# Patient Record
Sex: FEMALE | Race: WHITE | Employment: OTHER | URBAN - NONMETROPOLITAN AREA
[De-identification: names, ages, dates, MRNs, and addresses within clinical notes are randomized per-mention and may not be internally consistent; named-entity substitution may affect disease eponyms.]

---

## 2017-06-23 ENCOUNTER — HOSPITAL ENCOUNTER (OUTPATIENT)
Dept: WOMENS IMAGING | Age: 47
Discharge: HOME | End: 2017-06-23
Payer: OTHER GOVERNMENT

## 2017-06-23 DIAGNOSIS — Z12.39 BREAST CANCER SCREENING: ICD-10-CM

## 2017-06-23 PROCEDURE — 77063 BREAST TOMOSYNTHESIS BI: CPT

## 2017-08-19 ENCOUNTER — TELEPHONE (OUTPATIENT)
Dept: PRIMARY CARE CLINIC | Age: 47
End: 2017-08-19

## 2017-08-22 ENCOUNTER — TELEPHONE (OUTPATIENT)
Dept: FAMILY MEDICINE CLINIC | Age: 47
End: 2017-08-22

## 2017-08-24 DIAGNOSIS — G93.32 CFS (CHRONIC FATIGUE SYNDROME): ICD-10-CM

## 2017-08-24 DIAGNOSIS — Z00.00 WELL ADULT EXAM: Primary | ICD-10-CM

## 2017-09-11 DIAGNOSIS — Z00.00 WELL ADULT EXAM: ICD-10-CM

## 2017-09-11 DIAGNOSIS — G93.32 CFS (CHRONIC FATIGUE SYNDROME): ICD-10-CM

## 2017-09-11 LAB
ALBUMIN SERPL-MCNC: 4.5 G/DL (ref 3.5–5.2)
ALP BLD-CCNC: 65 U/L (ref 35–104)
ALT SERPL-CCNC: 17 U/L (ref 5–33)
ANION GAP SERPL CALCULATED.3IONS-SCNC: 17 MMOL/L (ref 7–19)
AST SERPL-CCNC: 18 U/L (ref 5–32)
BASOPHILS ABSOLUTE: 0 K/UL (ref 0–0.2)
BASOPHILS RELATIVE PERCENT: 0.6 % (ref 0–1)
BILIRUB SERPL-MCNC: 0.4 MG/DL (ref 0.2–1.2)
BUN BLDV-MCNC: 8 MG/DL (ref 6–20)
CALCIUM SERPL-MCNC: 9.2 MG/DL (ref 8.6–10)
CHLORIDE BLD-SCNC: 98 MMOL/L (ref 98–111)
CHOLESTEROL, TOTAL: 213 MG/DL (ref 160–199)
CO2: 24 MMOL/L (ref 22–29)
CREAT SERPL-MCNC: 0.6 MG/DL (ref 0.5–0.9)
EOSINOPHILS ABSOLUTE: 0.1 K/UL (ref 0–0.6)
EOSINOPHILS RELATIVE PERCENT: 1.2 % (ref 0–5)
GFR NON-AFRICAN AMERICAN: >60
GLUCOSE BLD-MCNC: 90 MG/DL (ref 74–109)
HCT VFR BLD CALC: 39.8 % (ref 37–47)
HDLC SERPL-MCNC: 101 MG/DL (ref 65–121)
HEMOGLOBIN: 13.2 G/DL (ref 12–16)
LDL CHOLESTEROL CALCULATED: 95 MG/DL
LYMPHOCYTES ABSOLUTE: 2 K/UL (ref 1.1–4.5)
LYMPHOCYTES RELATIVE PERCENT: 29.9 % (ref 20–40)
MCH RBC QN AUTO: 32.7 PG (ref 27–31)
MCHC RBC AUTO-ENTMCNC: 33.2 G/DL (ref 33–37)
MCV RBC AUTO: 98.5 FL (ref 81–99)
MONOCYTES ABSOLUTE: 0.6 K/UL (ref 0–0.9)
MONOCYTES RELATIVE PERCENT: 8.5 % (ref 0–10)
NEUTROPHILS ABSOLUTE: 3.9 K/UL (ref 1.5–7.5)
NEUTROPHILS RELATIVE PERCENT: 59.5 % (ref 50–65)
PDW BLD-RTO: 11.9 % (ref 11.5–14.5)
PLATELET # BLD: 295 K/UL (ref 130–400)
PMV BLD AUTO: 10.5 FL (ref 9.4–12.3)
POTASSIUM SERPL-SCNC: 4.3 MMOL/L (ref 3.5–5)
RBC # BLD: 4.04 M/UL (ref 4.2–5.4)
SODIUM BLD-SCNC: 139 MMOL/L (ref 136–145)
T4 FREE: 1.4 NG/ML (ref 0.9–1.7)
TOTAL PROTEIN: 7.4 G/DL (ref 6.6–8.7)
TRIGL SERPL-MCNC: 85 MG/DL (ref 150–199)
TSH SERPL DL<=0.05 MIU/L-ACNC: 1.89 UIU/ML (ref 0.27–4.2)
WBC # BLD: 6.6 K/UL (ref 4.8–10.8)

## 2017-09-15 DIAGNOSIS — G93.32 CHRONIC FATIGUE SYNDROME: ICD-10-CM

## 2017-09-15 DIAGNOSIS — F41.8 DEPRESSION WITH ANXIETY: ICD-10-CM

## 2017-09-15 DIAGNOSIS — E55.9 AVITAMINOSIS D: ICD-10-CM

## 2017-09-15 PROBLEM — J30.2 SEASONAL ALLERGIC RHINITIS: Status: ACTIVE | Noted: 2017-09-15

## 2017-09-15 PROBLEM — K21.9 ACID REFLUX DISEASE: Status: ACTIVE | Noted: 2017-09-15

## 2017-09-15 RX ORDER — ACETAMINOPHEN 160 MG
1 TABLET,DISINTEGRATING ORAL DAILY
COMMUNITY
End: 2017-09-18 | Stop reason: SDUPTHER

## 2017-09-15 RX ORDER — LEVOCETIRIZINE DIHYDROCHLORIDE 5 MG/1
5 TABLET, FILM COATED ORAL NIGHTLY
COMMUNITY
End: 2017-09-18 | Stop reason: ALTCHOICE

## 2017-09-15 RX ORDER — TRIAMCINOLONE ACETONIDE 55 UG/1
2 SPRAY, METERED NASAL DAILY
COMMUNITY
End: 2017-09-18 | Stop reason: ALTCHOICE

## 2017-09-15 RX ORDER — ESOMEPRAZOLE MAGNESIUM 40 MG/1
40 CAPSULE, DELAYED RELEASE ORAL
COMMUNITY
End: 2017-09-18 | Stop reason: ALTCHOICE

## 2017-09-18 ENCOUNTER — OFFICE VISIT (OUTPATIENT)
Dept: FAMILY MEDICINE CLINIC | Age: 47
End: 2017-09-18
Payer: OTHER GOVERNMENT

## 2017-09-18 VITALS
OXYGEN SATURATION: 98 % | TEMPERATURE: 98.3 F | WEIGHT: 127.25 LBS | RESPIRATION RATE: 18 BRPM | DIASTOLIC BLOOD PRESSURE: 82 MMHG | HEIGHT: 63 IN | SYSTOLIC BLOOD PRESSURE: 120 MMHG | HEART RATE: 94 BPM | BODY MASS INDEX: 22.55 KG/M2

## 2017-09-18 DIAGNOSIS — Z00.00 ANNUAL PHYSICAL EXAM: Primary | ICD-10-CM

## 2017-09-18 DIAGNOSIS — E55.9 AVITAMINOSIS D: ICD-10-CM

## 2017-09-18 DIAGNOSIS — J30.2 SEASONAL ALLERGIC RHINITIS, UNSPECIFIED ALLERGIC RHINITIS TRIGGER: ICD-10-CM

## 2017-09-18 DIAGNOSIS — Z12.11 SCREENING FOR COLON CANCER: ICD-10-CM

## 2017-09-18 DIAGNOSIS — K21.9 GASTROESOPHAGEAL REFLUX DISEASE WITHOUT ESOPHAGITIS: ICD-10-CM

## 2017-09-18 DIAGNOSIS — F41.8 DEPRESSION WITH ANXIETY: ICD-10-CM

## 2017-09-18 DIAGNOSIS — M51.36 DEGENERATIVE DISC DISEASE, LUMBAR: ICD-10-CM

## 2017-09-18 DIAGNOSIS — F33.40 SEASONAL AFFECTIVE DISORDER IN REMISSION (HCC): ICD-10-CM

## 2017-09-18 PROBLEM — M51.369 DEGENERATIVE DISC DISEASE, LUMBAR: Status: ACTIVE | Noted: 2017-09-18

## 2017-09-18 PROCEDURE — 99396 PREV VISIT EST AGE 40-64: CPT | Performed by: INTERNAL MEDICINE

## 2017-09-18 RX ORDER — ACETAMINOPHEN 160 MG
TABLET,DISINTEGRATING ORAL
Qty: 24 CAPSULE | Refills: 3
Start: 2017-09-18 | End: 2018-09-20 | Stop reason: ALTCHOICE

## 2017-09-18 ASSESSMENT — PATIENT HEALTH QUESTIONNAIRE - PHQ9
SUM OF ALL RESPONSES TO PHQ9 QUESTIONS 1 & 2: 0
2. FEELING DOWN, DEPRESSED OR HOPELESS: 0
1. LITTLE INTEREST OR PLEASURE IN DOING THINGS: 0
SUM OF ALL RESPONSES TO PHQ QUESTIONS 1-9: 0

## 2017-09-18 ASSESSMENT — ENCOUNTER SYMPTOMS
RHINORRHEA: 0
DIARRHEA: 0
EYE DISCHARGE: 0
COUGH: 0
ABDOMINAL PAIN: 0
SINUS PRESSURE: 0
EYE REDNESS: 0
SHORTNESS OF BREATH: 0
EYE PAIN: 0
COLOR CHANGE: 0
BLOOD IN STOOL: 0
VOMITING: 0
WHEEZING: 0
CHEST TIGHTNESS: 0
VOICE CHANGE: 0
SORE THROAT: 0

## 2017-12-08 ENCOUNTER — OFFICE VISIT (OUTPATIENT)
Dept: URGENT CARE | Age: 47
End: 2017-12-08
Payer: OTHER GOVERNMENT

## 2017-12-08 VITALS
BODY MASS INDEX: 22.93 KG/M2 | DIASTOLIC BLOOD PRESSURE: 83 MMHG | SYSTOLIC BLOOD PRESSURE: 137 MMHG | RESPIRATION RATE: 20 BRPM | HEART RATE: 90 BPM | WEIGHT: 129.38 LBS | OXYGEN SATURATION: 99 % | TEMPERATURE: 98 F | HEIGHT: 63 IN

## 2017-12-08 DIAGNOSIS — R05.9 COUGH: Primary | ICD-10-CM

## 2017-12-08 PROCEDURE — 96372 THER/PROPH/DIAG INJ SC/IM: CPT | Performed by: PHYSICIAN ASSISTANT

## 2017-12-08 PROCEDURE — 99213 OFFICE O/P EST LOW 20 MIN: CPT | Performed by: PHYSICIAN ASSISTANT

## 2017-12-08 RX ORDER — DEXAMETHASONE SODIUM PHOSPHATE 10 MG/ML
10 INJECTION INTRAMUSCULAR; INTRAVENOUS ONCE
Status: COMPLETED | OUTPATIENT
Start: 2017-12-08 | End: 2017-12-08

## 2017-12-08 RX ORDER — DEXTROMETHORPHAN HYDROBROMIDE AND PROMETHAZINE HYDROCHLORIDE 15; 6.25 MG/5ML; MG/5ML
5 SYRUP ORAL NIGHTLY PRN
Qty: 180 ML | Refills: 0 | Status: SHIPPED | OUTPATIENT
Start: 2017-12-08 | End: 2017-12-15

## 2017-12-08 RX ADMIN — DEXAMETHASONE SODIUM PHOSPHATE 10 MG: 10 INJECTION INTRAMUSCULAR; INTRAVENOUS at 16:34

## 2017-12-08 ASSESSMENT — ENCOUNTER SYMPTOMS
CHEST TIGHTNESS: 0
WHEEZING: 0
COUGH: 1
VOMITING: 0
DIARRHEA: 0
ABDOMINAL PAIN: 0
SHORTNESS OF BREATH: 0
RHINORRHEA: 0
SORE THROAT: 0
NAUSEA: 0

## 2017-12-08 NOTE — PATIENT INSTRUCTIONS
notice more mucus or a change in the color of your mucus. ? · You have new symptoms, such as a sore throat, an earache, or sinus pain. ? · You do not get better as expected. Where can you learn more? Go to [email or web address] org and sign in to your [email or web address] account. Enter D279 in the SpectraLinear box to learn more about \"Cough: Care Instructions. \"     If you do not have an account, please click on the \"Sign Up Now\" link. Current as of: May 12, 2017  Content Version: 11.4  © 0619-8770 Health[employer], [employer]. Care instructions adapted under license by [facility] ([facility]). If you have questions about a medical condition or this instruction, always ask your healthcare professional. [address] any warranty or liability for your use of this information.

## 2017-12-15 ENCOUNTER — OFFICE VISIT (OUTPATIENT)
Dept: FAMILY MEDICINE CLINIC | Age: 47
End: 2017-12-15
Payer: OTHER GOVERNMENT

## 2017-12-15 VITALS
HEIGHT: 63 IN | TEMPERATURE: 96.8 F | BODY MASS INDEX: 22.32 KG/M2 | OXYGEN SATURATION: 98 % | WEIGHT: 126 LBS | SYSTOLIC BLOOD PRESSURE: 122 MMHG | HEART RATE: 76 BPM | DIASTOLIC BLOOD PRESSURE: 80 MMHG

## 2017-12-15 DIAGNOSIS — B96.89 BACTERIAL UPPER RESPIRATORY INFECTION: Primary | ICD-10-CM

## 2017-12-15 DIAGNOSIS — J06.9 BACTERIAL UPPER RESPIRATORY INFECTION: Primary | ICD-10-CM

## 2017-12-15 PROCEDURE — 99213 OFFICE O/P EST LOW 20 MIN: CPT | Performed by: CLINICAL NURSE SPECIALIST

## 2017-12-15 RX ORDER — AMOXICILLIN AND CLAVULANATE POTASSIUM 500; 125 MG/1; MG/1
1 TABLET, FILM COATED ORAL 2 TIMES DAILY
Qty: 14 TABLET | Refills: 0 | Status: SHIPPED | OUTPATIENT
Start: 2017-12-15 | End: 2017-12-22

## 2017-12-15 ASSESSMENT — ENCOUNTER SYMPTOMS
CHEST TIGHTNESS: 0
EYE PAIN: 0
FACIAL SWELLING: 0
NAUSEA: 0
SORE THROAT: 0
SHORTNESS OF BREATH: 0
COUGH: 1
SINUS PRESSURE: 1
VOMITING: 0
WHEEZING: 0
RHINORRHEA: 1
EYE DISCHARGE: 0

## 2018-01-03 ENCOUNTER — OFFICE VISIT (OUTPATIENT)
Dept: FAMILY MEDICINE CLINIC | Age: 48
End: 2018-01-03
Payer: OTHER GOVERNMENT

## 2018-01-03 VITALS
HEART RATE: 69 BPM | SYSTOLIC BLOOD PRESSURE: 134 MMHG | DIASTOLIC BLOOD PRESSURE: 84 MMHG | OXYGEN SATURATION: 99 % | BODY MASS INDEX: 23.38 KG/M2 | TEMPERATURE: 98.4 F | WEIGHT: 132 LBS

## 2018-01-03 DIAGNOSIS — J20.9 ACUTE BRONCHITIS, UNSPECIFIED ORGANISM: Primary | ICD-10-CM

## 2018-01-03 PROCEDURE — 99213 OFFICE O/P EST LOW 20 MIN: CPT | Performed by: CLINICAL NURSE SPECIALIST

## 2018-01-03 RX ORDER — CEFDINIR 300 MG/1
300 CAPSULE ORAL 2 TIMES DAILY
Qty: 14 CAPSULE | Refills: 0 | Status: SHIPPED | OUTPATIENT
Start: 2018-01-03 | End: 2018-01-10

## 2018-01-03 RX ORDER — PREDNISONE 20 MG/1
TABLET ORAL
Qty: 10 TABLET | Refills: 0 | Status: SHIPPED | OUTPATIENT
Start: 2018-01-03 | End: 2018-08-03 | Stop reason: ALTCHOICE

## 2018-01-03 ASSESSMENT — ENCOUNTER SYMPTOMS
WHEEZING: 0
SINUS PRESSURE: 0
SORE THROAT: 0
VOMITING: 0
COUGH: 1
RHINORRHEA: 0
FACIAL SWELLING: 0
EYE PAIN: 0
EYE DISCHARGE: 0
SHORTNESS OF BREATH: 0
CHEST TIGHTNESS: 1
NAUSEA: 0

## 2018-05-21 ENCOUNTER — OFFICE VISIT (OUTPATIENT)
Dept: FAMILY MEDICINE CLINIC | Age: 48
End: 2018-05-21
Payer: OTHER GOVERNMENT

## 2018-05-21 VITALS
HEIGHT: 63 IN | TEMPERATURE: 97.6 F | DIASTOLIC BLOOD PRESSURE: 78 MMHG | SYSTOLIC BLOOD PRESSURE: 118 MMHG | HEART RATE: 59 BPM | OXYGEN SATURATION: 97 % | BODY MASS INDEX: 22.68 KG/M2 | WEIGHT: 128 LBS

## 2018-05-21 DIAGNOSIS — L29.9 ITCHING: ICD-10-CM

## 2018-05-21 DIAGNOSIS — L23.7 ALLERGIC CONTACT DERMATITIS DUE TO PLANTS, EXCEPT FOOD: Primary | ICD-10-CM

## 2018-05-21 PROCEDURE — 99213 OFFICE O/P EST LOW 20 MIN: CPT | Performed by: INTERNAL MEDICINE

## 2018-05-21 PROCEDURE — 96372 THER/PROPH/DIAG INJ SC/IM: CPT | Performed by: INTERNAL MEDICINE

## 2018-05-21 RX ORDER — TRIAMCINOLONE ACETONIDE OINTMENT USP, 0.05% 0.5 MG/G
OINTMENT TOPICAL 2 TIMES DAILY
Qty: 45 G | Refills: 1 | Status: SHIPPED | OUTPATIENT
Start: 2018-05-21 | End: 2018-08-03

## 2018-05-21 RX ORDER — METHYLPREDNISOLONE 4 MG/1
TABLET ORAL
Qty: 1 KIT | Refills: 0 | Status: SHIPPED | OUTPATIENT
Start: 2018-05-21 | End: 2018-08-03 | Stop reason: ALTCHOICE

## 2018-05-21 RX ORDER — METHYLPREDNISOLONE ACETATE 80 MG/ML
80 INJECTION, SUSPENSION INTRA-ARTICULAR; INTRALESIONAL; INTRAMUSCULAR; SOFT TISSUE ONCE
Status: COMPLETED | OUTPATIENT
Start: 2018-05-21 | End: 2018-05-21

## 2018-05-21 RX ADMIN — METHYLPREDNISOLONE ACETATE 80 MG: 80 INJECTION, SUSPENSION INTRA-ARTICULAR; INTRALESIONAL; INTRAMUSCULAR; SOFT TISSUE at 17:43

## 2018-05-21 ASSESSMENT — ENCOUNTER SYMPTOMS
SINUS PRESSURE: 0
ROS SKIN COMMENTS: SEE HPI
VOMITING: 0
COLOR CHANGE: 0
ABDOMINAL PAIN: 0
SORE THROAT: 0
RHINORRHEA: 0
EYE PAIN: 0
CHEST TIGHTNESS: 0
WHEEZING: 0
BLOOD IN STOOL: 0
EYE DISCHARGE: 0
VOICE CHANGE: 0
DIARRHEA: 0
EYE REDNESS: 0
SHORTNESS OF BREATH: 0
COUGH: 0

## 2018-07-16 ENCOUNTER — HOSPITAL ENCOUNTER (OUTPATIENT)
Dept: WOMENS IMAGING | Age: 48
Discharge: HOME | End: 2018-07-16
Payer: OTHER GOVERNMENT

## 2018-07-16 DIAGNOSIS — Z12.39 BREAST CANCER SCREENING: ICD-10-CM

## 2018-07-16 PROCEDURE — 77063 BREAST TOMOSYNTHESIS BI: CPT

## 2018-07-17 ENCOUNTER — TELEPHONE (OUTPATIENT)
Dept: WOMENS IMAGING | Age: 48
End: 2018-07-17

## 2018-07-17 NOTE — TELEPHONE ENCOUNTER
Tried to contact patient to schedule diagnostic imaging     Patient called and scheduled diagnostic imaging.

## 2018-07-19 ENCOUNTER — HOSPITAL ENCOUNTER (OUTPATIENT)
Dept: WOMENS IMAGING | Age: 48
Discharge: HOME | End: 2018-07-19
Payer: OTHER GOVERNMENT

## 2018-07-19 ENCOUNTER — HOSPITAL ENCOUNTER (OUTPATIENT)
Dept: ULTRASOUND IMAGING | Age: 48
Discharge: HOME | End: 2018-07-19
Payer: OTHER GOVERNMENT

## 2018-07-19 DIAGNOSIS — N64.89 BREAST ASYMMETRY: ICD-10-CM

## 2018-07-19 PROCEDURE — 76642 ULTRASOUND BREAST LIMITED: CPT

## 2018-07-19 PROCEDURE — G0279 TOMOSYNTHESIS, MAMMO: HCPCS

## 2018-08-03 ENCOUNTER — OFFICE VISIT (OUTPATIENT)
Dept: FAMILY MEDICINE CLINIC | Age: 48
End: 2018-08-03
Payer: OTHER GOVERNMENT

## 2018-08-03 VITALS
DIASTOLIC BLOOD PRESSURE: 80 MMHG | SYSTOLIC BLOOD PRESSURE: 122 MMHG | HEART RATE: 75 BPM | WEIGHT: 131.6 LBS | HEIGHT: 63 IN | BODY MASS INDEX: 23.32 KG/M2 | TEMPERATURE: 97.1 F

## 2018-08-03 DIAGNOSIS — R92.8 ABNORMAL MAMMOGRAM OF RIGHT BREAST: ICD-10-CM

## 2018-08-03 DIAGNOSIS — H10.13 ALLERGIC CONJUNCTIVITIS OF BOTH EYES: ICD-10-CM

## 2018-08-03 PROCEDURE — 99213 OFFICE O/P EST LOW 20 MIN: CPT | Performed by: INTERNAL MEDICINE

## 2018-08-03 RX ORDER — AZELASTINE HYDROCHLORIDE 0.5 MG/ML
1 SOLUTION/ DROPS OPHTHALMIC 2 TIMES DAILY
Qty: 6 ML | Refills: 2 | Status: SHIPPED | OUTPATIENT
Start: 2018-08-03 | End: 2018-09-02

## 2018-08-03 ASSESSMENT — ENCOUNTER SYMPTOMS
SHORTNESS OF BREATH: 0
BLOOD IN STOOL: 0
EYE DISCHARGE: 1
COLOR CHANGE: 0
PHOTOPHOBIA: 0
RHINORRHEA: 0
COUGH: 0
ABDOMINAL PAIN: 0
EYE REDNESS: 1
VOICE CHANGE: 0
EYE PAIN: 0
WHEEZING: 0
DIARRHEA: 0
SINUS PRESSURE: 0
CHEST TIGHTNESS: 0
VOMITING: 0
SORE THROAT: 0

## 2018-08-03 NOTE — PATIENT INSTRUCTIONS
Patient Education        Pinkeye: Care Instructions  Your Care Instructions    Pinkeye is redness and swelling of the eye surface and the conjunctiva (the lining of the eyelid and the covering of the white part of the eye). Pinkeye is also called conjunctivitis. Pinkeye is often caused by infection with bacteria or a virus. Dry air, allergies, smoke, and chemicals are other common causes. Pinkeye often clears on its own in 7 to 10 days. Antibiotics only help if the pinkeye is caused by bacteria. Pinkeye caused by infection spreads easily. If an allergy or chemical is causing pinkeye, it will not go away unless you can avoid whatever is causing it. Follow-up care is a key part of your treatment and safety. Be sure to make and go to all appointments, and call your doctor if you are having problems. It's also a good idea to know your test results and keep a list of the medicines you take. How can you care for yourself at home? · Wash your hands often. Always wash them before and after you treat pinkeye or touch your eyes or face. · Use moist cotton or a clean, wet cloth to remove crust. Wipe from the inside corner of the eye to the outside. Use a clean part of the cloth for each wipe. · Put cold or warm wet cloths on your eye a few times a day if the eye hurts. · Do not wear contact lenses or eye makeup until the pinkeye is gone. Throw away any eye makeup you were using when you got pinkeye. Clean your contacts and storage case. If you wear disposable contacts, use a new pair when your eye has cleared and it is safe to wear contacts again. · If the doctor gave you antibiotic ointment or eyedrops, use them as directed. Use the medicine for as long as instructed, even if your eye starts looking better soon. Keep the bottle tip clean, and do not let it touch the eye area. · To put in eyedrops or ointment:  ¨ Tilt your head back, and pull your lower eyelid down with one finger.   ¨ Drop or squirt the medicine inside the lower lid. ¨ Close your eye for 30 to 60 seconds to let the drops or ointment move around. ¨ Do not touch the ointment or dropper tip to your eyelashes or any other surface. · Do not share towels, pillows, or washcloths while you have pinkeye. When should you call for help? Call your doctor now or seek immediate medical care if:    · You have pain in your eye, not just irritation on the surface.     · You have a change in vision or loss of vision.     · You have an increase in discharge from the eye.     · Your eye has not started to improve or begins to get worse within 48 hours after you start using antibiotics.     · Pinkeye lasts longer than 7 days.    Watch closely for changes in your health, and be sure to contact your doctor if you have any problems. Where can you learn more? Go to [email or web address] [email or web address] org and sign in to your Codigames account. Enter Y392 in the BeiBei box to learn more about \"Pinkeye: Care Instructions. \"     If you do not have an account, please click on the \"Sign Up Now\" link. Current as of: November 20, 2017  Content Version: 11.6  © 1566-2155 [employer]. Care instructions adapted under license by [facility] ([facility]). If you have questions about a medical condition or this instruction, always ask your healthcare professional. [place] [address] any warranty or liability for your use of this information. Patient Education        Managing Your Allergies: Care Instructions  Your Care Instructions    Managing your allergies is an important part of staying healthy. Your doctor will help you find out what may be causing the allergies. Common causes of allergy symptoms are house dust and dust mites, animal dander, mold, and pollen. As soon as you know what triggers your symptoms, try to reduce your exposure to your triggers. This can help prevent allergy symptoms, asthma, and other health problems.   Ask your doctor

## 2018-08-03 NOTE — PROGRESS NOTES
Suresh Ashley is a 46 y.o. female who presents today for   Chief Complaint   Patient presents with    Conjunctivitis     bilateral       HPI  47 y/o WF here with c/o eyes burning, feels like a film over them, and drainage x1 week. No fever, sinus drainage, cough or vision changes otherwise. She is having some mucoid drainage from her eyes intermittently and some crusting of lashes. No eye pain or swelling. She would also like to discuss results of recent Breast US, breast spot compression, and screening mammogram. She is concerned about the findings despite radiologist saying it is probably benign (BI-RADS category 3). She is suppose to get a repeat right sided spot compression mammogram in 6 mths with breast US to make sure the nodule is stable (5 mm). The nodule seen on the initial mammogram 7/16/18 in the upper right breast was not visible on the spot compression view or on the right breast US. There were no suspicious micro-calcifications, skin thickening, or nipple retraction noted by the radiologist. She has breast implants and performs monthly self breast exams but she denies finding any recent masses or nodules. No FHx of breast cancer. Review of Systems   Constitutional: Negative for appetite change, chills, fatigue and fever. HENT: Negative for ear pain, rhinorrhea, sinus pressure, sore throat and voice change. Eyes: Positive for discharge and redness. Negative for photophobia and pain. See HPI   Respiratory: Negative for cough, chest tightness, shortness of breath and wheezing. Cardiovascular: Negative for chest pain and palpitations. Gastrointestinal: Negative for abdominal pain, blood in stool, diarrhea and vomiting. Endocrine: Negative for cold intolerance and polydipsia. Genitourinary: Negative for dysuria and hematuria. Musculoskeletal: Negative for arthralgias, myalgias, neck pain and neck stiffness. Skin: Negative for color change and rash.    Neurological: Negative for dizziness, weakness, numbness and headaches. Hematological: Negative for adenopathy. Does not bruise/bleed easily. Psychiatric/Behavioral: Negative for confusion, dysphoric mood and sleep disturbance. The patient is not nervous/anxious. Past Medical History:   Diagnosis Date    Acute gastritis     Allergic rhinitis     Basal cell carcinoma     Chondromalacia of knee     Degenerative disc disease, lumbar 9/18/2017    Depression with anxiety     Dermoid cyst of leg     GERD (gastroesophageal reflux disease)     IBS (irritable bowel syndrome)     Lower back pain     Lumbar radiculopathy     Muscle spasm of back     [facility]al [facility] 9/18/2017    Thoracic outlet syndrome     Uterine leiomyoma        Current Outpatient Prescriptions   Medication Sig Dispense Refill    azelastine (OPTIVAR) 0.05 % ophthalmic solution Place 1 drop into both eyes 2 times daily 6 mL 2    Cholecalciferol (VITAMIN D3) 2000 units CAPS Take one capsule twice weekly 24 capsule 3    sertraline (ZOLOFT) 50 MG tablet Take 50 mg by mouth daily       No current facility-administered medications for this visit.         No Known Allergies    Past Surgical History:   Procedure Laterality Date    ARTHROPLASTY      BREAST ENHANCEMENT SURGERY      COSMETIC SURGERY      HYSTERECTOMY, TOTAL ABDOMINAL      MOHS SURGERY      upper right extremity       Social History   Substance Use Topics    Smoking status: Former Smoker    Smokeless tobacco: Never Used    Alcohol use Yes      Comment: rarely       Family History   Problem Relation Age of Onset    Other Mother         suicide    High Blood Pressure Father     Heart Disease Father         a-fib    Other Father         dvt    High Blood Pressure Brother     Colon Cancer Maternal Grandfather        /80   Pulse 75   Temp 97.1 °F (36.2 °C)   Ht 5' 3\" (1.6 m)   Wt 131 lb 9.6 oz (59.7 kg)   LMP 05/20/2017   PF 97 L/min BMI 23.31 kg/m²     Physical Exam   Constitutional: She is oriented to person, place, and time. She appears well-nourished. No distress. HENT:   Head: Normocephalic and atraumatic. Right Ear: External ear normal.   Left Ear: External ear normal.   Mouth/Throat: Oropharynx is clear and moist.   Eyes: EOM are normal. Pupils are equal, round, and reactive to light. Right eye exhibits no discharge. Left eye exhibits no discharge. +mild injection of bulbar conjunctiva, palpebral conjunctiva normal   Neck: Neck supple. No JVD present. No thyromegaly present. Cardiovascular: Normal rate, regular rhythm, normal heart sounds and intact distal pulses. Exam reveals no gallop and no friction rub. No murmur heard. Pulmonary/Chest: Effort normal and breath sounds normal.   Abdominal: Soft. Bowel sounds are normal. She exhibits no distension. There is no tenderness. Musculoskeletal: She exhibits no edema, tenderness or deformity. Lymphadenopathy:     She has no cervical adenopathy. Neurological: She is alert and oriented to person, place, and time. Skin: Skin is warm. No rash noted. She is not diaphoretic. Psychiatric: She has a normal mood and affect. Her behavior is normal. Judgment and thought content normal.   Vitals reviewed.       Lab Results   Component Value Date    WBC 6.6 09/11/2017    HGB 13.2 09/11/2017    HCT 39.8 09/11/2017    MCV 98.5 09/11/2017     09/11/2017    LYMPHOPCT 29.9 09/11/2017    RBC 4.04 (L) 09/11/2017    MCH 32.7 (H) 09/11/2017    MCHC 33.2 09/11/2017    RDW 11.9 09/11/2017     Lab Results   Component Value Date     09/11/2017    K 4.3 09/11/2017    CL 98 09/11/2017    CO2 24 09/11/2017    BUN 8 09/11/2017    CREATININE 0.6 09/11/2017    GLUCOSE 90 09/11/2017    CALCIUM 9.2 09/11/2017     Lab Results   Component Value Date    CHOL 213 09/11/2017    TRIG 85 09/11/2017     09/11/2017    [address] 09/11/2017     Lab Results   Component Value Date    T4FREE 1.4

## 2018-08-12 PROBLEM — R92.8 ABNORMAL MAMMOGRAM OF RIGHT BREAST: Status: ACTIVE | Noted: 2018-08-12

## 2018-09-13 DIAGNOSIS — E55.9 AVITAMINOSIS D: ICD-10-CM

## 2018-09-13 DIAGNOSIS — Z00.00 ANNUAL PHYSICAL EXAM: ICD-10-CM

## 2018-09-13 LAB
ALBUMIN SERPL-MCNC: 4.6 G/DL (ref 3.5–5.2)
ALP BLD-CCNC: 71 U/L (ref 35–104)
ALT SERPL-CCNC: 19 U/L (ref 5–33)
ANION GAP SERPL CALCULATED.3IONS-SCNC: 13 MMOL/L (ref 7–19)
AST SERPL-CCNC: 18 U/L (ref 5–32)
BASOPHILS ABSOLUTE: 0 K/UL (ref 0–0.2)
BASOPHILS RELATIVE PERCENT: 0.7 % (ref 0–1)
BILIRUB SERPL-MCNC: 0.4 MG/DL (ref 0.2–1.2)
BUN BLDV-MCNC: 12 MG/DL (ref 6–20)
CALCIUM SERPL-MCNC: 9.7 MG/DL (ref 8.6–10)
CHLORIDE BLD-SCNC: 102 MMOL/L (ref 98–111)
CHOLESTEROL, TOTAL: 220 MG/DL (ref 160–199)
CO2: 25 MMOL/L (ref 22–29)
CREAT SERPL-MCNC: 0.6 MG/DL (ref 0.5–0.9)
EOSINOPHILS ABSOLUTE: 0 K/UL (ref 0–0.6)
EOSINOPHILS RELATIVE PERCENT: 0.7 % (ref 0–5)
GFR NON-AFRICAN AMERICAN: >60
GLUCOSE BLD-MCNC: 96 MG/DL (ref 74–109)
HCT VFR BLD CALC: 39 % (ref 37–47)
HDLC SERPL-MCNC: 88 MG/DL (ref 65–121)
HEMOGLOBIN: 12.5 G/DL (ref 12–16)
LDL CHOLESTEROL CALCULATED: 120 MG/DL
LYMPHOCYTES ABSOLUTE: 1.5 K/UL (ref 1.1–4.5)
LYMPHOCYTES RELATIVE PERCENT: 28.1 % (ref 20–40)
MCH RBC QN AUTO: 31.3 PG (ref 27–31)
MCHC RBC AUTO-ENTMCNC: 32.1 G/DL (ref 33–37)
MCV RBC AUTO: 97.7 FL (ref 81–99)
MONOCYTES ABSOLUTE: 0.4 K/UL (ref 0–0.9)
MONOCYTES RELATIVE PERCENT: 7.8 % (ref 0–10)
NEUTROPHILS ABSOLUTE: 3.4 K/UL (ref 1.5–7.5)
NEUTROPHILS RELATIVE PERCENT: 62.5 % (ref 50–65)
PDW BLD-RTO: 12.1 % (ref 11.5–14.5)
PLATELET # BLD: 310 K/UL (ref 130–400)
PMV BLD AUTO: 9.8 FL (ref 9.4–12.3)
POTASSIUM SERPL-SCNC: 4 MMOL/L (ref 3.5–5)
RBC # BLD: 3.99 M/UL (ref 4.2–5.4)
SODIUM BLD-SCNC: 140 MMOL/L (ref 136–145)
T4 FREE: 1.4 NG/DL (ref 0.9–1.7)
TOTAL PROTEIN: 7.1 G/DL (ref 6.6–8.7)
TRIGL SERPL-MCNC: 58 MG/DL (ref 0–149)
TSH SERPL DL<=0.05 MIU/L-ACNC: 1.66 UIU/ML (ref 0.27–4.2)
VITAMIN D 25-HYDROXY: 24.9 NG/ML
WBC # BLD: 5.5 K/UL (ref 4.8–10.8)

## 2018-09-20 ENCOUNTER — OFFICE VISIT (OUTPATIENT)
Dept: FAMILY MEDICINE CLINIC | Age: 48
End: 2018-09-20
Payer: OTHER GOVERNMENT

## 2018-09-20 VITALS
DIASTOLIC BLOOD PRESSURE: 86 MMHG | OXYGEN SATURATION: 99 % | WEIGHT: 132.13 LBS | TEMPERATURE: 97.2 F | HEIGHT: 63 IN | SYSTOLIC BLOOD PRESSURE: 116 MMHG | HEART RATE: 75 BPM | BODY MASS INDEX: 23.41 KG/M2

## 2018-09-20 DIAGNOSIS — K21.9 GASTROESOPHAGEAL REFLUX DISEASE WITHOUT ESOPHAGITIS: ICD-10-CM

## 2018-09-20 DIAGNOSIS — E55.9 AVITAMINOSIS D: ICD-10-CM

## 2018-09-20 DIAGNOSIS — K50.00 CROHN'S DISEASE OF SMALL INTESTINE WITHOUT COMPLICATION (HCC): ICD-10-CM

## 2018-09-20 DIAGNOSIS — Z23 NEED FOR SHINGLES VACCINE: ICD-10-CM

## 2018-09-20 DIAGNOSIS — Z23 NEED FOR TDAP VACCINATION: ICD-10-CM

## 2018-09-20 DIAGNOSIS — Z00.00 ANNUAL PHYSICAL EXAM: Primary | ICD-10-CM

## 2018-09-20 DIAGNOSIS — D12.2 ADENOMATOUS POLYP OF ASCENDING COLON: ICD-10-CM

## 2018-09-20 DIAGNOSIS — R92.8 ABNORMAL MAMMOGRAM OF RIGHT BREAST: ICD-10-CM

## 2018-09-20 PROCEDURE — 90750 HZV VACC RECOMBINANT IM: CPT | Performed by: INTERNAL MEDICINE

## 2018-09-20 PROCEDURE — 99396 PREV VISIT EST AGE 40-64: CPT | Performed by: INTERNAL MEDICINE

## 2018-09-20 PROCEDURE — 90715 TDAP VACCINE 7 YRS/> IM: CPT | Performed by: INTERNAL MEDICINE

## 2018-09-20 PROCEDURE — 90471 IMMUNIZATION ADMIN: CPT | Performed by: INTERNAL MEDICINE

## 2018-09-20 PROCEDURE — 90472 IMMUNIZATION ADMIN EACH ADD: CPT | Performed by: INTERNAL MEDICINE

## 2018-09-20 PROCEDURE — 99213 OFFICE O/P EST LOW 20 MIN: CPT | Performed by: INTERNAL MEDICINE

## 2018-09-20 RX ORDER — OMEPRAZOLE 40 MG/1
40 CAPSULE, DELAYED RELEASE ORAL DAILY
COMMUNITY
End: 2022-10-31

## 2018-09-20 RX ORDER — ERGOCALCIFEROL 1.25 MG/1
50000 CAPSULE ORAL WEEKLY
Qty: 12 CAPSULE | Refills: 3 | Status: SHIPPED | OUTPATIENT
Start: 2018-09-20 | End: 2021-03-31 | Stop reason: SDUPTHER

## 2018-09-20 RX ORDER — MESALAMINE 500 MG/1
CAPSULE ORAL
COMMUNITY
Start: 2018-09-11 | End: 2021-04-30

## 2018-09-20 RX ORDER — LANOLIN ALCOHOL/MO/W.PET/CERES
400 CREAM (GRAM) TOPICAL DAILY
COMMUNITY
End: 2019-12-19

## 2018-09-20 ASSESSMENT — ENCOUNTER SYMPTOMS
VOMITING: 0
EYE PAIN: 0
EYE REDNESS: 0
COUGH: 0
WHEEZING: 0
BLOOD IN STOOL: 0
DIARRHEA: 1
EYE DISCHARGE: 0
RHINORRHEA: 0
VOICE CHANGE: 0
SORE THROAT: 0
SHORTNESS OF BREATH: 0
CONSTIPATION: 1
SINUS PRESSURE: 0
CHEST TIGHTNESS: 0
ABDOMINAL PAIN: 0
COLOR CHANGE: 0

## 2018-09-20 ASSESSMENT — PATIENT HEALTH QUESTIONNAIRE - PHQ9
2. FEELING DOWN, DEPRESSED OR HOPELESS: 0
SUM OF ALL RESPONSES TO PHQ QUESTIONS 1-9: 0
1. LITTLE INTEREST OR PLEASURE IN DOING THINGS: 0
SUM OF ALL RESPONSES TO PHQ QUESTIONS 1-9: 0
SUM OF ALL RESPONSES TO PHQ9 QUESTIONS 1 & 2: 0

## 2018-09-20 NOTE — PROGRESS NOTES
Suresh Ashley is a 46 y.o. female who presents today for   Chief Complaint   Patient presents with    Annual Exam       HPI  47 y/o WF here for annual wellness. She was dx with Crohn's disease with ulcer in terminal ileum on cscope 8/14/18. Previous colonoscopy 5 yrs ago was normal. Long hx of alternating constipation and diarrhea. She has EGD scheduled next week with GI (Dr Nathan Ayala) also to further evaluate for Crohn's. 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 UGI with small bowel follow through was normal on review. She was given Rx for pentasa to take 2000 mg twice daily but she has not started it yet because she wanted to talk to PCP first. She also had a adenomatous colon polyp and handout from GI said to take aspirin, calcium, and folate and eat high fiber diet but she is concerned she may not need to take these supplements and aspirin. She would like a 2nd opinion on the dx of Crohn's with another specialist if possible. P-Anca and C-Anca were negative with normal CRP on review. She had been taking vitamin D 50,000 units weekly for vitamin D deficiency but she ran out of the medication and recent level was low. Recent mammogram on 7/16/18 required spot compression right breast along with right breast exam to evaluate an \"indeterminate possible new 3 mm partially obscured nodule in the superior aspect of the right breast wall\" seen on the implant which was also very stressful for her. US of the right breast did not show the nodule but the spot compression showed a well circumscribed 5 mm nodule of the right upper breast read as \"probably benign\" but radiology requested a f/u spot compression in 6 mths (approx 1/19/2019). Review of Systems   Constitutional: Negative for appetite change, chills, fatigue and fever.    HENT: Negative for ear pain, rhinorrhea, sinus pressure, sore throat and facility-administered medications for this visit. No Known Allergies    Past Surgical History:   Procedure Laterality Date    ARTHROPLASTY      BREAST ENHANCEMENT SURGERY      COSMETIC SURGERY      HYSTERECTOMY, TOTAL ABDOMINAL      MOHS SURGERY      upper right extremity       Social History   Substance Use Topics    Smoking status: Former Smoker    Smokeless tobacco: Never Used    Alcohol use Yes      Comment: rarely       Family History   Problem Relation Age of Onset    Other Mother         suicide    High Blood Pressure Father     Heart Disease Father         a-fib    Other Father         dvt    High Blood Pressure Brother     Colon Cancer Maternal Grandfather        /86   Pulse 75   Temp 97.2 °F (36.2 °C) (Temporal)   Ht 5' 3\" (1.6 m)   Wt 132 lb 2 oz (59.9 kg)   LMP 05/20/2017   SpO2 99%   BMI 23.40 kg/m²     Physical Exam   Constitutional: She is oriented to person, place, and time. She appears well-developed and well-nourished. HENT:   Head: Normocephalic and atraumatic. Right Ear: External ear normal.   Left Ear: External ear normal.   Nose: Nose normal.   Mouth/Throat: Oropharynx is clear and moist.   Eyes: Pupils are equal, round, and reactive to light. Conjunctivae and EOM are normal. No scleral icterus. Neck: Normal range of motion. Neck supple. No JVD present. No thyromegaly present. No carotid bruits   Cardiovascular: Normal rate, regular rhythm, normal heart sounds and intact distal pulses. Exam reveals no gallop and no friction rub. No murmur heard. Pulmonary/Chest: Effort normal and breath sounds normal.   Abdominal: Soft. Bowel sounds are normal. She exhibits no distension and no mass. There is no hepatosplenomegaly. There is no tenderness. Genitourinary: No breast swelling, tenderness, discharge or bleeding. Genitourinary Comments: deferred   Musculoskeletal: Normal range of motion. She exhibits no edema.    Lymphadenopathy:     She has no exam Z00.00 V70.0    2. Crohn's disease of small intestine without complication (HCC) T02.27 555.0 PENTASA 500 MG extended release capsule      External Referral To Gastroenterology   3. Adenomatous polyp of ascending colon D12.2 211.3 External Referral To Gastroenterology   4. Avitaminosis D E55.9 268.9 Vitamin D 25 Hydroxy      vitamin D (ERGOCALCIFEROL) 31255 units CAPS capsule   5. Gastroesophageal reflux disease without esophagitis K21.9 530.81 omeprazole (PRILOSEC) 40 MG delayed release capsule   6. Abnormal mammogram of right breast R92.8 793.80    7. Need for Tdap vaccination Z23 V06.1 Tdap (age 6y and older) IM ([address])   8. Need for shingles vaccine Z23 V04.89 Zoster recombinant [facility])       Plan:  Olinda Blunt was seen today for annual exam.    Diagnoses and all orders for this visit:    Annual physical exam    Crohn's disease of small intestine without complication ([facility] [address]  -     External Referral To Gastroenterology    Adenomatous polyp of ascending colon  -     External Referral To Gastroenterology    Avitaminosis D  -     Vitamin D 25 Hydroxy; Future  -     vitamin D (ERGOCALCIFEROL) 97876 units CAPS capsule; Take 1 capsule by mouth once a week    Gastroesophageal reflux disease without esophagitis    Abnormal mammogram of right breast    Need for Tdap vaccination  -     Tdap (age 6y and older) IM ([address])    Need for shingles vaccine  -     Zoster recombinant [facility])    Problem Based Plannin. Labs reviewed with patient  2. Refills for omeprazole for GERD and high dose vitamin D for vitamin D deficiency provided. 3. Referral to [address] for 2nd opinion on recent dx of Crohn's disease and for treatment recommendations. Records from previous visits/testing scanned into chart. 4. Would recommend avoiding aspirin given recent diagnosis of Crohn's disease would increased risk of GIB.  Based Plannin. Repeat right spot compression mammogram in 2019.  On exam, questionable the type that can turn into cancer, you may need more tests to examine your entire colon. The doctor will remove any other polyps that he or she finds, and you will be tested more often. Follow-up care is a key part of your treatment and safety. Be sure to make and go to all appointments, and call your doctor if you are having problems. It's also a good idea to know your test results and keep a list of the medicines you take. How can you care for yourself at home? Regular exams to look for colon polyps are the best way to prevent polyps from turning into colon cancer. These can include stool tests, sigmoidoscopy, colonoscopy, and CT colonography. Talk with your doctor about a testing schedule that is right for you. To prevent polyps  There is no home treatment that can prevent colon polyps. But these steps may help lower your risk for cancer. · Stay active. Being active can help you get to and stay at a healthy weight. Try to exercise on most days of the week. Walking is a good choice. · Eat well. Choose a variety of vegetables, fruits, legumes (such as peas and beans), fish, poultry, and whole grains. · Do not smoke. If you need help quitting, talk to your doctor about stop-smoking programs and medicines. These can increase your chances of quitting for good. · If you drink alcohol, limit how much you drink. Limit alcohol to 2 drinks a day for men and 1 drink a day for women. When should you call for help? Call your doctor now or seek immediate medical care if:    · You have severe belly pain.     · Your stools are maroon or very bloody.    Watch closely for changes in your health, and be sure to contact your doctor if:    · You have a fever.     · You have nausea or vomiting.     · You have a change in bowel habits (new constipation or diarrhea).     · Your symptoms get worse or are not improving as expected. Where can you learn more? Go to https://imelda.healthMobiquity Technologies. org and sign in to your [employer] account. Enter [id number] in the [facility] box to learn more about \"Colon Polyps: Care Instructions. \"     If you do not have an account, please click on the \"Sign Up Now\" link. Current as of: May 12, 2017  Content Version: 11.7  © 5068-9616 [employer]. Care instructions adapted under license by [facility] ([facility]). If you have questions about a medical condition or this instruction, always ask your healthcare professional. [address] any warranty or liability for your use of this information. Patient Education        Crohn's Disease: Care Instructions  Your Care Instructions    Crohn's disease is a lifelong inflammatory bowel disease (IBD). Parts of the digestive tract get swollen and irritated and may develop deep sores called ulcers. Crohn's disease usually occurs in the last part of the small intestine and the first part of the large intestine. But it can develop anywhere from the mouth to the anus. The main symptoms of Crohn's disease are belly pain, diarrhea, fever, and weight loss. Some people may have constipation. Crohn's disease also sometimes causes problems with the joints, eyes, or skin. Your symptoms may be mild at some times and severe at others. The disease can also go into remission, which means that it is not active and you have no symptoms. Bad attacks of Crohn's disease often have to be treated in the hospital so that you can get medicines and liquids through a tube in your vein, called an IV. This gives your digestive system time to rest and recover. Talk with your doctor about the best treatments for you. You may need medicines that help prevent or treat flare-ups of the disease. You may need surgery to remove part of your bowel if you have an abnormal opening in the bowel (fistula), an abscess, or a bowel obstruction. In some cases, surgery is needed if medicines do not work.  But symptoms often return to other areas of the intestines after surgery. Learning good self-care can help you reduce your symptoms and manage Crohn's disease. Follow-up care is a key part of your treatment and safety. Be sure to make and go to all appointments, and call your doctor if you are having problems. It's also a good idea to know your test results and keep a list of the medicines you take. How can you care for yourself at home? · Take your medicines exactly as prescribed. Call your doctor if you think you are having a problem with your medicine. You will get more details on the specific medicines your doctor prescribes. · Do not take anti-inflammatory medicines, such as aspirin, ibuprofen (Advil, Motrin), or naproxen (Aleve). They may make your symptoms worse. Do not take any other medicines or herbal products without talking to your doctor first.  · Avoid foods that make your symptoms worse. These might include milk, alcohol, high-fiber foods, or spicy foods. · Eat a healthy diet. Make sure to get enough iron. Rectal bleeding may make you lose iron. Good sources of iron include beef, lentils, spinach, raisins, and iron-enriched breads and cereals. · Drink liquid meal replacements if your doctor recommends them. These are high in calories and contain vitamins and minerals. Severe symptoms may make it hard for your body to absorb vitamins and minerals. · Do not smoke. Smoking makes Crohn's disease worse. If you need help quitting, talk to your doctor about stop-smoking programs and medicines. These can increase your chances of quitting for good. · Seek support from friends and family to help cope with Crohn's disease. The illness can affect all parts of your life. Get counseling if you need it. When should you call for help? Call 911 anytime you think you may need emergency care.  For example, call if:    · Your stools are maroon or very bloody.     · You passed out (lost consciousness).    Call your doctor now or seek immediate medical care if:    · You are vomiting.     · You have new or worse belly pain.     · You have a fever.     · You cannot pass stools or gas.     · You have new or more blood in your stools.    Watch closely for changes in your health, and be sure to contact your doctor if:    · You have new or worse symptoms.     · You are losing weight.     · You do not get better as expected. Where can you learn more? Go to https://AlterGeopeServiceTitaneb.[employer]. org and sign in to your [employer] account. Enter 21 [phone] in the [employer] box to learn more about \"Crohn's Disease: Care Instructions. \"     If you do not have an account, please click on the \"Sign Up Now\" link. Current as of: May 12, 2017  Content Version: 11.7  © 2875-8110 [employer]. Care instructions adapted under license by [facility] ([facility]). If you have questions about a medical condition or this instruction, always ask your healthcare professional. [place] [address] any warranty or liability for your use of this information. Patient Education        Learning About Vitamin D  Why is it important to get enough vitamin D? Your body needs vitamin D to absorb calcium. Calcium keeps your bones and muscles, including your heart, healthy and strong. If your muscles don't get enough calcium, they can cramp, hurt, or feel weak. You may have long-term (chronic) muscle aches and pains. If you don't get enough vitamin D throughout life, you have an increased chance of having thin and brittle bones (osteoporosis) in your later years. Children who don't get enough vitamin D may not grow as much as others their age. They also have a chance of getting a rare disease called rickets. It causes weak bones. Vitamin D and calcium are added to many foods. And your body uses sunshine to make its own vitamin D. How much vitamin D do you need? The [place] of Medicine recommends that people ages 3 through 79 get 600 IU (international units) every day.  Adults 71 and older need 800 IU every day. Blood tests for vitamin D can check your vitamin D level. But there is no standard normal range used by all laboratories. The [place] of Medicine recommends a blood level of 20 ng/mL of vitamin D for healthy bones. And most people in the [place] and [facility] ([facility]) meet this goal.  How can you get more vitamin D? Foods that contain vitamin D include:  · [place], tuna, and mackerel. These are some of the best foods to eat when you need to get more vitamin D.  · Cheese, egg yolks, and beef liver. These foods have vitamin D in small amounts. · Milk, soy drinks, orange juice, yogurt, margarine, and some kinds of cereal have vitamin D added to them. Some people don't make vitamin D as well as others. They may have to take extra care in getting enough vitamin D. Things that reduce how much vitamin D your body makes include:  · Dark skin, such as many  Americans have. · Age, especially if you are older than 72. · Digestive problems, such as Crohn's or celiac disease. · Liver and kidney disease. Some people who do not get enough vitamin D may need supplements. Are there any risks from taking vitamin D?  · Too much vitamin D:  ¨ Can damage your kidneys. ¨ Can cause nausea and vomiting, constipation, and weakness. ¨ Raises the amount of calcium in your blood. If this happens, you can get confused or have an irregular heart rhythm. · Vitamin D may interact with other medicines. Tell your doctor about all of the medicines you take, including over-the-counter drugs, herbs, and pills. Tell your doctor about all of your current medical problems. Where can you learn more? Go to https://imelda.OpenCloud. org and sign in to your zerobound account. Enter [phone] in the [employer] box to learn more about \"Learning About Vitamin D. \"     If you do not have an account, please click on the \"Sign Up Now\" link. Current as of:  May 12, 2017  Content Version: 11.7  © drink a day. Too much alcohol can cause health problems. Follow your doctor's advice about when to have certain tests. These tests can spot problems early. · Cholesterol. Your doctor will tell you how often to have this done based on your age, family history, or other things that can increase your risk for heart attack and stroke. · Blood pressure. Have your blood pressure checked during a routine doctor visit. Your doctor will tell you how often to check your blood pressure based on your age, your blood pressure results, and other factors. · Mammogram. Ask your doctor how often you should have a mammogram, which is an X-ray of your breasts. A mammogram can spot breast cancer before it can be felt and when it is easiest to treat. · Pap test and pelvic exam. Ask your doctor how often you should have a Pap test. You may not need to have a Pap test as often as you used to. · Vision. Have your eyes checked every year or two or as often as your doctor suggests. Some experts recommend that you have yearly exams for glaucoma and other age-related eye problems starting at age 48. · Hearing. Tell your doctor if you notice any change in your hearing. You can have tests to find out how well you hear. · Diabetes. Ask your doctor whether you should have tests for diabetes. · Colon cancer. You should begin tests for colon cancer at age 48. You may have one of several tests. Your doctor will tell you how often to have tests based on your age and risk. Risks include whether you already had a precancerous polyp removed from your colon or whether your parents, sisters and brothers, or children have had colon cancer. · Thyroid disease. Talk to your doctor about whether to have your thyroid checked as part of a regular physical exam. Women have an increased chance of a thyroid problem. · Osteoporosis. You should begin tests for bone density at age 72.  If you are younger than 72, ask your doctor whether you have factors that

## 2018-09-20 NOTE — PATIENT INSTRUCTIONS
Patient Education        Colon Polyps: Care Instructions  Your Care Instructions    Colon polyps are growths in the colon or the rectum. The cause of most colon polyps is not known, and most people who get them do not have any problems. But a certain kind can turn into cancer. For this reason, regular testing for colon polyps is important for people age 48 and older and anyone who has an increased risk for colon cancer. Polyps are usually found through routine colon cancer screening tests. Although most colon polyps are not cancerous, they are usually removed and then tested for cancer. Screening for colon cancer saves lives because the cancer can usually be cured if it is caught early. If you have a polyp that is the type that can turn into cancer, you may need more tests to examine your entire colon. The doctor will remove any other polyps that he or she finds, and you will be tested more often. Follow-up care is a key part of your treatment and safety. Be sure to make and go to all appointments, and call your doctor if you are having problems. It's also a good idea to know your test results and keep a list of the medicines you take. How can you care for yourself at home? Regular exams to look for colon polyps are the best way to prevent polyps from turning into colon cancer. These can include stool tests, sigmoidoscopy, colonoscopy, and CT colonography. Talk with your doctor about a testing schedule that is right for you. To prevent polyps  There is no home treatment that can prevent colon polyps. But these steps may help lower your risk for cancer. · Stay active. Being active can help you get to and stay at a healthy weight. Try to exercise on most days of the week. Walking is a good choice. · Eat well. Choose a variety of vegetables, fruits, legumes (such as peas and beans), fish, poultry, and whole grains. · Do not smoke.  If you need help quitting, talk to your doctor about stop-smoking programs and at others. The disease can also go into remission, which means that it is not active and you have no symptoms. Bad attacks of Crohn's disease often have to be treated in the hospital so that you can get medicines and liquids through a tube in your vein, called an IV. This gives your digestive system time to rest and recover. Talk with your doctor about the best treatments for you. You may need medicines that help prevent or treat flare-ups of the disease. You may need surgery to remove part of your bowel if you have an abnormal opening in the bowel (fistula), an abscess, or a bowel obstruction. In some cases, surgery is needed if medicines do not work. But symptoms often return to other areas of the intestines after surgery. Learning good self-care can help you reduce your symptoms and manage Crohn's disease. Follow-up care is a key part of your treatment and safety. Be sure to make and go to all appointments, and call your doctor if you are having problems. It's also a good idea to know your test results and keep a list of the medicines you take. How can you care for yourself at home? · Take your medicines exactly as prescribed. Call your doctor if you think you are having a problem with your medicine. You will get more details on the specific medicines your doctor prescribes. · Do not take anti-inflammatory medicines, such as aspirin, ibuprofen (Advil, Motrin), or naproxen (Aleve). They may make your symptoms worse. Do not take any other medicines or herbal products without talking to your doctor first.  · Avoid foods that make your symptoms worse. These might include milk, alcohol, high-fiber foods, or spicy foods. · Eat a healthy diet. Make sure to get enough iron. Rectal bleeding may make you lose iron. Good sources of iron include beef, lentils, spinach, raisins, and iron-enriched breads and cereals. · Drink liquid meal replacements if your doctor recommends them.  These are high in calories and contain vitamins and minerals. Severe symptoms may make it hard for your body to absorb vitamins and minerals. · Do not smoke. Smoking makes Crohn's disease worse. If you need help quitting, talk to your doctor about stop-smoking programs and medicines. These can increase your chances of quitting for good. · Seek support from friends and family to help cope with Crohn's disease. The illness can affect all parts of your life. Get counseling if you need it. When should you call for help? Call 911 anytime you think you may need emergency care. For example, call if:    · Your stools are maroon or very bloody.     · You passed out (lost consciousness).    Call your doctor now or seek immediate medical care if:    · You are vomiting.     · You have new or worse belly pain.     · You have a fever.     · You cannot pass stools or gas.     · You have new or more blood in your stools.    Watch closely for changes in your health, and be sure to contact your doctor if:    · You have new or worse symptoms.     · You are losing weight.     · You do not get better as expected. Where can you learn more? Go to https://[employer]ream[employer]aver International.Clickpass. org and sign in to your Lithera account. Enter [phone] in the [facility] box to learn more about \"Crohn's Disease: Care Instructions. \"     If you do not have an account, please click on the \"Sign Up Now\" link. Current as of: May 12, 2017  Content Version: 11.7  © 7079-7376 "[employer]. Care instructions adapted under license by [facility] ([facility]). If you have questions about a medical condition or this instruction, always ask your healthcare professional. [place] [address] any warranty or liability for your use of this information. Patient Education        Learning About Vitamin D  Why is it important to get enough vitamin D? Your body needs vitamin D to absorb calcium.  Calcium keeps your bones and muscles, including your heart, healthy and nausea and vomiting, constipation, and weakness. ¨ Raises the amount of calcium in your blood. If this happens, you can get confused or have an irregular heart rhythm. · Vitamin D may interact with other medicines. Tell your doctor about all of the medicines you take, including over-the-counter drugs, herbs, and pills. Tell your doctor about all of your current medical problems. Where can you learn more? Go to https://Trackpepiceweb.Yopima. org and sign in to your Redgage account. Enter [id number] in the [employer] box to learn more about \"Learning About Vitamin D. \"     If you do not have an account, please click on the \"Sign Up Now\" link. Current as of: May 12, 2017  Content Version: 11.7  © 4961-3515 [employer]. Care instructions adapted under license by [facility] ([facility]). If you have questions about a medical condition or this instruction, always ask your healthcare professional. [place] [address] any warranty or liability for your use of this information. Patient Education        Well Visit, Women 48 to 72: Care Instructions  Your Care Instructions    Physical exams can help you stay healthy. Your doctor has checked your overall health and may have suggested ways to take good care of yourself. He or she also may have recommended tests. At home, you can help prevent illness with healthy eating, regular exercise, and other steps. Follow-up care is a key part of your treatment and safety. Be sure to make and go to all appointments, and call your doctor if you are having problems. It's also a good idea to know your test results and keep a list of the medicines you take. How can you care for yourself at home? · Reach and stay at a healthy weight. This will lower your risk for many problems, such as obesity, diabetes, heart disease, and high blood pressure. · Get at least 30 minutes of exercise on most days of the week. Walking is a good choice.  You also may want to do other activities, such as running, swimming, cycling, or playing tennis or team sports. · Do not smoke. Smoking can make health problems worse. If you need help quitting, talk to your doctor about stop-smoking programs and medicines. These can increase your chances of quitting for good. · Protect your skin from too much sun. When you're outdoors from 10 a.m. to 4 p.m., stay in the shade or cover up with clothing and a hat with a wide brim. Wear sunglasses that block UV rays. Even when it's cloudy, put broad-spectrum sunscreen (SPF 30 or higher) on any exposed skin. · See a dentist one or two times a year for checkups and to have your teeth cleaned. · Wear a seat belt in the car. · Limit alcohol to 1 drink a day. Too much alcohol can cause health problems. Follow your doctor's advice about when to have certain tests. These tests can spot problems early. · Cholesterol. Your doctor will tell you how often to have this done based on your age, family history, or other things that can increase your risk for heart attack and stroke. · Blood pressure. Have your blood pressure checked during a routine doctor visit. Your doctor will tell you how often to check your blood pressure based on your age, your blood pressure results, and other factors. · Mammogram. Ask your doctor how often you should have a mammogram, which is an X-ray of your breasts. A mammogram can spot breast cancer before it can be felt and when it is easiest to treat. · Pap test and pelvic exam. Ask your doctor how often you should have a Pap test. You may not need to have a Pap test as often as you used to. · Vision. Have your eyes checked every year or two or as often as your doctor suggests. Some experts recommend that you have yearly exams for glaucoma and other age-related eye problems starting at age 48. · Hearing. Tell your doctor if you notice any change in your hearing. You can have tests to find out how well you hear. · Diabetes. Ask your doctor whether you should have tests for diabetes. · Colon cancer. You should begin tests for colon cancer at age 48. You may have one of several tests. Your doctor will tell you how often to have tests based on your age and risk. Risks include whether you already had a precancerous polyp removed from your colon or whether your parents, sisters and brothers, or children have had colon cancer. · Thyroid disease. Talk to your doctor about whether to have your thyroid checked as part of a regular physical exam. Women have an increased chance of a thyroid problem. · Osteoporosis. You should begin tests for bone density at age 72. If you are younger than 72, ask your doctor whether you have factors that may increase your risk for this disease. You may want to have this test before age 72. · Heart attack and stroke risk. At least every 4 to 6 years, you should have your risk for heart attack and stroke assessed. Your doctor uses factors such as your age, blood pressure, cholesterol, and whether you smoke or have diabetes to show what your risk for a heart attack or stroke is over the next 10 years. When should you call for help? Watch closely for changes in your health, and be sure to contact your doctor if you have any problems or symptoms that concern you. Where can you learn more? Go to https://Jugo.edelight. org and sign in to your [email or web address] account. Enter Z338 in the [facility] box to learn more about \"Well Visit, Women 50 to 72: Care Instructions. \"     If you do not have an account, please click on the \"Sign Up Now\" link. Current as of: May 16, 2017  Content Version: 11.7  © 9600-7908 [employer]. Care instructions adapted under license by [facility] [employer] [facility] ([facility]). If you have questions about a medical condition or this instruction, always ask your healthcare professional. [address] any warranty or liability for your use of this information.

## 2018-12-10 DIAGNOSIS — E55.9 AVITAMINOSIS D: ICD-10-CM

## 2018-12-10 LAB — VITAMIN D 25-HYDROXY: 32.6 NG/ML

## 2018-12-17 ENCOUNTER — TELEPHONE (OUTPATIENT)
Dept: FAMILY MEDICINE CLINIC | Age: 48
End: 2018-12-17

## 2018-12-17 ENCOUNTER — OFFICE VISIT (OUTPATIENT)
Dept: FAMILY MEDICINE CLINIC | Age: 48
End: 2018-12-17
Payer: OTHER GOVERNMENT

## 2018-12-17 VITALS
OXYGEN SATURATION: 98 % | WEIGHT: 135 LBS | HEART RATE: 92 BPM | SYSTOLIC BLOOD PRESSURE: 118 MMHG | BODY MASS INDEX: 23.92 KG/M2 | TEMPERATURE: 97.9 F | HEIGHT: 63 IN | DIASTOLIC BLOOD PRESSURE: 68 MMHG

## 2018-12-17 DIAGNOSIS — K50.80 CROHN'S DISEASE OF BOTH SMALL AND LARGE INTESTINE WITHOUT COMPLICATION (HCC): ICD-10-CM

## 2018-12-17 DIAGNOSIS — R92.8 ABNORMAL MAMMOGRAM OF RIGHT BREAST: ICD-10-CM

## 2018-12-17 DIAGNOSIS — E55.9 AVITAMINOSIS D: Primary | ICD-10-CM

## 2018-12-17 PROCEDURE — 99214 OFFICE O/P EST MOD 30 MIN: CPT | Performed by: INTERNAL MEDICINE

## 2018-12-17 ASSESSMENT — ENCOUNTER SYMPTOMS
EYE PAIN: 0
VOMITING: 0
EYE REDNESS: 0
ABDOMINAL PAIN: 0
COUGH: 0
SINUS PRESSURE: 0
VOICE CHANGE: 0
SHORTNESS OF BREATH: 0
CONSTIPATION: 1
RHINORRHEA: 0
COLOR CHANGE: 0
DIARRHEA: 1
WHEEZING: 0
SORE THROAT: 0
CHEST TIGHTNESS: 0
EYE DISCHARGE: 0
BLOOD IN STOOL: 0

## 2018-12-17 NOTE — PROGRESS NOTES
5. Shingrix #2 when available. 6. Follow up in 6 mths for vitamin D deficiency    Orders Placed This Encounter   Procedures    Vitamin D 25 Hydroxy     Standing Status:   Future     Standing Expiration Date:   12/17/2019     No orders of the defined types were placed in this encounter. There are no discontinued medications. There are no Patient Instructions on file for this visit. Patient voices understanding and agrees to plans along with risks and benefits of plan. Counseling:  Aunvalentina Huang Letitia's case, medications and options were discussed in detail. Patient was instructed to call the office if she   questions regarding her treatment. Should her conditions worsen, she should return to office to be reassessed by Dr. Marquez George. she  Should to go the closest Emergency Department for any emergency. They verbalized understanding the above instructions. Return in about 6 months (around 6/17/2019) for vitamin D deficiency.

## 2018-12-29 PROBLEM — K50.90 CROHN'S DISEASE WITHOUT COMPLICATION (HCC): Status: ACTIVE | Noted: 2018-12-29

## 2019-01-23 ENCOUNTER — HOSPITAL ENCOUNTER (OUTPATIENT)
Dept: WOMENS IMAGING | Age: 49
Discharge: HOME | End: 2019-01-23
Payer: OTHER GOVERNMENT

## 2019-01-23 DIAGNOSIS — Z12.39 SCREENING FOR BREAST CANCER: Primary | ICD-10-CM

## 2019-01-23 DIAGNOSIS — Z12.39 BREAST CANCER SCREENING: ICD-10-CM

## 2019-01-23 DIAGNOSIS — N63.0 BREAST NODULE: ICD-10-CM

## 2019-01-23 PROCEDURE — G0279 TOMOSYNTHESIS, MAMMO: HCPCS

## 2019-06-26 ENCOUNTER — TELEPHONE (OUTPATIENT)
Dept: WOMENS IMAGING | Age: 49
End: 2019-06-26

## 2019-07-12 ENCOUNTER — TELEPHONE (OUTPATIENT)
Dept: WOMENS IMAGING | Age: 49
End: 2019-07-12

## 2019-09-05 ENCOUNTER — HOSPITAL ENCOUNTER (OUTPATIENT)
Dept: WOMENS IMAGING | Age: 49
Discharge: HOME | End: 2019-09-05
Payer: OTHER GOVERNMENT

## 2019-09-05 ENCOUNTER — HOSPITAL ENCOUNTER (OUTPATIENT)
Dept: WOMENS IMAGING | Age: 49
End: 2019-09-05
Payer: OTHER GOVERNMENT

## 2019-09-05 DIAGNOSIS — R92.8 ABNORMAL MAMMOGRAM: ICD-10-CM

## 2019-09-05 PROCEDURE — G0279 TOMOSYNTHESIS, MAMMO: HCPCS

## 2019-12-19 ENCOUNTER — OFFICE VISIT (OUTPATIENT)
Dept: FAMILY MEDICINE CLINIC | Age: 49
End: 2019-12-19
Payer: OTHER GOVERNMENT

## 2019-12-19 ENCOUNTER — HOSPITAL ENCOUNTER (OUTPATIENT)
Dept: GENERAL RADIOLOGY | Age: 49
Discharge: HOME | End: 2019-12-19
Payer: OTHER GOVERNMENT

## 2019-12-19 ENCOUNTER — HOSPITAL ENCOUNTER (OUTPATIENT)
Dept: NON INVASIVE DIAGNOSTICS | Age: 49
Discharge: HOME | End: 2019-12-19
Payer: OTHER GOVERNMENT

## 2019-12-19 VITALS
BODY MASS INDEX: 23.67 KG/M2 | SYSTOLIC BLOOD PRESSURE: 126 MMHG | HEIGHT: 63 IN | DIASTOLIC BLOOD PRESSURE: 78 MMHG | OXYGEN SATURATION: 98 % | TEMPERATURE: 98 F | HEART RATE: 77 BPM | WEIGHT: 133.6 LBS

## 2019-12-19 DIAGNOSIS — R23.2 HOT FLASHES: ICD-10-CM

## 2019-12-19 DIAGNOSIS — N89.8 VAGINAL DRYNESS: ICD-10-CM

## 2019-12-19 DIAGNOSIS — R55 SYNCOPE, UNSPECIFIED SYNCOPE TYPE: ICD-10-CM

## 2019-12-19 DIAGNOSIS — N94.10 DYSPAREUNIA IN FEMALE: ICD-10-CM

## 2019-12-19 DIAGNOSIS — R61 NIGHT SWEATS: ICD-10-CM

## 2019-12-19 LAB
ALBUMIN SERPL-MCNC: 4.7 G/DL (ref 3.5–5.2)
ALP BLD-CCNC: 94 U/L (ref 35–104)
ALT SERPL-CCNC: 23 U/L (ref 5–33)
ANION GAP SERPL CALCULATED.3IONS-SCNC: 13 MMOL/L (ref 7–19)
AST SERPL-CCNC: 20 U/L (ref 5–32)
BASOPHILS ABSOLUTE: 0.1 K/UL (ref 0–0.2)
BASOPHILS RELATIVE PERCENT: 0.7 % (ref 0–1)
BILIRUB SERPL-MCNC: <0.2 MG/DL (ref 0.2–1.2)
BUN BLDV-MCNC: 13 MG/DL (ref 6–20)
CALCIUM SERPL-MCNC: 9.5 MG/DL (ref 8.6–10)
CHLORIDE BLD-SCNC: 101 MMOL/L (ref 98–111)
CO2: 26 MMOL/L (ref 22–29)
CREAT SERPL-MCNC: 0.6 MG/DL (ref 0.5–0.9)
EOSINOPHILS ABSOLUTE: 0 K/UL (ref 0–0.6)
EOSINOPHILS RELATIVE PERCENT: 0.6 % (ref 0–5)
FOLLICLE STIMULATING HORMONE: 75.6 MIU/ML
GFR NON-AFRICAN AMERICAN: >60
GLUCOSE BLD-MCNC: 96 MG/DL (ref 74–109)
HCT VFR BLD CALC: 41.5 % (ref 37–47)
HEMOGLOBIN: 13.3 G/DL (ref 12–16)
IMMATURE GRANULOCYTES #: 0 K/UL
LUTEINIZING HORMONE: 52.3 MIU/ML
LYMPHOCYTES ABSOLUTE: 2 K/UL (ref 1.1–4.5)
LYMPHOCYTES RELATIVE PERCENT: 29.2 % (ref 20–40)
MCH RBC QN AUTO: 30.8 PG (ref 27–31)
MCHC RBC AUTO-ENTMCNC: 32 G/DL (ref 33–37)
MCV RBC AUTO: 96.1 FL (ref 81–99)
MONOCYTES ABSOLUTE: 0.5 K/UL (ref 0–0.9)
MONOCYTES RELATIVE PERCENT: 7 % (ref 0–10)
NEUTROPHILS ABSOLUTE: 4.2 K/UL (ref 1.5–7.5)
NEUTROPHILS RELATIVE PERCENT: 62.4 % (ref 50–65)
PDW BLD-RTO: 11.9 % (ref 11.5–14.5)
PLATELET # BLD: 343 K/UL (ref 130–400)
PMV BLD AUTO: 10 FL (ref 9.4–12.3)
POTASSIUM SERPL-SCNC: 4.2 MMOL/L (ref 3.5–5)
PROGESTERONE LEVEL: <0.05 NG/ML
RBC # BLD: 4.32 M/UL (ref 4.2–5.4)
SODIUM BLD-SCNC: 140 MMOL/L (ref 136–145)
T4 FREE: 1.43 NG/DL (ref 0.93–1.7)
TOTAL PROTEIN: 7.6 G/DL (ref 6.6–8.7)
TSH SERPL DL<=0.05 MIU/L-ACNC: 1.37 UIU/ML (ref 0.27–4.2)
WBC # BLD: 6.8 K/UL (ref 4.8–10.8)

## 2019-12-19 PROCEDURE — 99214 OFFICE O/P EST MOD 30 MIN: CPT | Performed by: INTERNAL MEDICINE

## 2019-12-19 PROCEDURE — 93005 ELECTROCARDIOGRAM TRACING: CPT

## 2019-12-19 PROCEDURE — 71046 X-RAY EXAM CHEST 2 VIEWS: CPT

## 2019-12-19 NOTE — PROGRESS NOTES
capsule       vitamin D (ERGOCALCIFEROL) 64859 units CAPS capsule Take 1 capsule by mouth once a week (Patient not taking: Reported on 12/19/2019) 12 capsule 3     No current facility-administered medications for this visit. No Known Allergies    Past Surgical History:   Procedure Laterality Date    ARTHROPLASTY      BREAST ENHANCEMENT SURGERY      COSMETIC SURGERY      HYSTERECTOMY, TOTAL ABDOMINAL  2005    uterine fibroids    MOHS SURGERY      upper right extremity       Social History     Tobacco Use    Smoking status: Former Smoker    Smokeless tobacco: Never Used   Substance Use Topics    Alcohol use: Yes     Comment: rarely    Drug use: No       Family History   Problem Relation Age of Onset    Depression Mother         MDD with Suicide    High Blood Pressure Father     Heart Disease Father         a-fib    Other Father         DVT    High Blood Pressure Brother     Colon Cancer Maternal Grandfather        /78   Pulse 77   Temp 98 °F (36.7 °C)   Ht 5' 3\" (1.6 m)   Wt 133 lb 9.6 oz (60.6 kg)   LMP 05/20/2017   SpO2 98%   BMI 23.67 kg/m²     Physical Exam  Vitals signs reviewed. Constitutional:       General: She is not in acute distress. Appearance: She is well-developed and normal weight. She is not ill-appearing, toxic-appearing or diaphoretic. HENT:      Head: Normocephalic and atraumatic. Right Ear: Tympanic membrane, ear canal and external ear normal.      Left Ear: Tympanic membrane, ear canal and external ear normal.      Nose: Nose normal.      Mouth/Throat:      Mouth: Mucous membranes are moist.      Tongue: No lesions. Palate: No lesions. Pharynx: Oropharynx is clear. Uvula midline. No posterior oropharyngeal erythema or uvula swelling. Tonsils: No tonsillar exudate. Eyes:      General:         Right eye: No discharge. Left eye: No discharge.       Conjunctiva/sclera: Conjunctivae normal.      Pupils: Pupils are equal, round, This dryness can make sex painful. Talk to your doctor about what might be causing your painful sex. Treatment may help. Follow-up care is a key part of your treatment and safety. Be sure to make and go to all appointments, and call your doctor if you are having problems. It's also a good idea to know your test results and keep a list of the medicines you take. How can you care for yourself at home? · Use a vaginal lubricant during sex. Examples are Astroglide, K-Y Jelly, and Wet Gel Lubricant. · Increase the time you and your partner spend touching each other before sex. This is called foreplay. · Try different positions for sex to find the most comfortable ones. · Ask your doctor about exercises to strengthen and relax your pelvic muscles. · Before sex, take a warm bath. This can relax you and reduce anxiety. · If your doctor prescribes any medicines, take them exactly as prescribed. Call your doctor if you think you are having a problem with your medicine. When should you call for help? Watch closely for changes in your health, and be sure to contact your doctor if you have any problems. Where can you learn more? Go to https://Stamplaypepiceweb.healthZnapshoppartners. org and sign in to your "[employer]" account. Enter N606 in the TraderTools box to learn more about \"Painful Sex: Care Instructions. \"     If you do not have an account, please click on the \"Sign Up Now\" link. Current as of: February 19, 2019  Content Version: 12.1  © 9858-3926 [employer]. Care instructions adapted under license by [facility] "[employer] [facility] ([facility]). If you have questions about a medical condition or this instruction, always ask your healthcare professional. [place] [address] any warranty or liability for your use of this information. Patient Education        Hot Flashes During Menopause: Care Instructions  Your Care Instructions    A hot flash is a sudden feeling of intense body heat.  Your head, neck, and chest may get red. Your heartbeat may speed up, and you may feel anxious or irritable. You may find that hot flashes occur more often in warm rooms or during stressful times. Hot flashes and other symptoms are a normal response to the hormone changes that occur before your menstrual cycle goes away completely (menopause). Hot flashes often get better and go away with time. Making a few changes, such as exercising more, practicing meditation, quitting smoking, and drinking less alcohol, can help. Some women take hormone pills or other medicine to treat bothersome symptoms. Follow-up care is a key part of your treatment and safety. Be sure to make and go to all appointments, and call your doctor if you are having problems. It's also a good idea to know your test results and keep a list of the medicines you take. How can you care for yourself at home? · If you decide to take medicine to treat hot flashes, take it exactly as prescribed. Call your doctor if you think you are having a problem with your medicine. You will get more details on the specific medicine your doctor prescribes. · Learn to meditate. Sit quietly and focus on your breathing. Try to practice each day. Books, classes, and tapes can help you start a program.  · Wear natural fabrics, such as cotton and silk. Dress in layers so you can take off clothes as needed. · Keep the room temperature cool, or use a fan. You are more likely to have a hot flash when you are too warm than when you are cool. · Use fewer blankets when you sleep at night. · Drink cold fluids rather than hot ones. Limit your intake of caffeine and alcohol. · Eat smaller meals more often during the day so your body makes less heat than when digesting large amounts of food. Eat low-fat and high-fiber foods. · Do not smoke. Smoking can make hot flashes worse. If you need help quitting, talk to your doctor about stop-smoking programs and medicines.  These can increase your chances of can tell you the best ways to protect your bones from thinning. Follow-up care is a key part of your treatment and safety. Be sure to make and go to all appointments, and call your doctor if you are having problems. It's also a good idea to know your test results and keep a list of the medicines you take. How can you care for yourself at home? · Get enough calcium and vitamin D. The [place] of [employer] recommends adults younger than age 46 need 1,000 mg of calcium and 600 IU of vitamin D each day. Women ages 46 to 79 need 1,200 mg of calcium and 600 IU of vitamin D each day. Men ages 46 to 79 need 1,000 mg of calcium and 600 IU of vitamin D each day. Adults 71 and older need 1,200 mg of calcium and 800 IU of vitamin D each day. ? Eat foods rich in calcium, like yogurt, cheese, milk, and dark green vegetables. ? Eat foods rich in vitamin D, like eggs, fatty fish, cereal, and fortified milk. ? Get some sunshine. Your body uses sunshine to make its own vitamin D. The safest time to be out in the sun is before 10 a.m. or after 3 p.m. Avoid getting sunburned. Sunburn can increase your risk of skin cancer. ? Talk to your doctor about taking a calcium plus vitamin D supplement. Ask about what type of calcium is right for you, and how much to take at a time. Adults ages 23 to 48 should not get more than 2,500 mg of calcium and 4,000 IU of vitamin D each day, whether it is from supplements and/or food. Adults ages 46 and older should not get more than 2,000 mg of calcium and 4,000 IU of vitamin D each day from supplements and/or food. · Get regular bone-building exercise. Weight-bearing and resistance exercises keep bones healthy by working the muscles and bones against gravity. Start out at an exercise level that feels right for you. Add a little at a time until you can do the following:  ? Do 30 minutes of weight-bearing exercise on most days of the week.  Walking, jogging, stair climbing, and dancing are good choices. ? Do resistance exercises with weights or elastic bands 2 to 3 days a week. · Limit alcohol. Drink no more than 1 alcohol drink a day if you are a woman. Drink no more than 2 alcohol drinks a day if you are a man. · Do not smoke. Smoking can make bones thin faster. If you need help quitting, talk to your doctor about stop-smoking programs and medicines. These can increase your chances of quitting for good. When should you call for help? Watch closely for changes in your health, and be sure to contact your doctor if you have any problems. Where can you learn more? Go to https://chpepiceweb.WeGather. org and sign in to your [employer] account. Enter O217 in the Helpjuice.com box to learn more about \"Preventing Osteoporosis: Care Instructions. \"     If you do not have an account, please click on the \"Sign Up Now\" link. Current as of: November 7, 2018  Content Version: 12.1  © 0621-3278 [employer]. Care instructions adapted under license by [facility] ([facility]). If you have questions about a medical condition or this instruction, always ask your healthcare professional. [place] [address] any warranty or liability for your use of this information. Patient Education        Hormone Therapy (HT): Care Instructions  Your Care Instructions    Hormone therapy (HT) is medicine to treat symptoms of menopause, such as hot flashes, vaginal dryness, and sleep problems. It replaces the hormones that drop at menopause. Most women get relief from these symptoms within weeks of starting HT. HT contains two female hormones, estrogen and progestin. HT may come in the form of a pill, patch, gel, spray, or vaginal ring. A vaginal cream or a vaginal ring that has a much lower dose of estrogen may be used to relieve vaginal dryness only. HT has some risks. Most doctors recommend that women only take HT for as short a time as possible.  This is to reduce the chances of heart disease, breast cancer, blood clots, and stroke that may be connected to HT. Be sure to have regular checkups with your doctor when taking HT. Talk with your doctor about whether HT is right for you. If you decide that the benefits of HT outweigh the risks, ask your doctor to prescribe the lowest effective dose for as short a time as possible. Follow-up care is a key part of your treatment and safety. Be sure to make and go to all appointments, and call your doctor if you are having problems. It's also a good idea to know your test results and keep a list of the medicines you take. Why might you take HT?  · HT reduces symptoms of menopause. These include hot flashes, mood swings, and sleep problems. · The estrogen in HT helps to prevent thinning bones. And it may lower the chance of colon cancer. · HT helps keep the lining of the vagina moist and thick. This can reduce irritation. · HT helps protect against dental problems, such as tooth loss and gum disease. What are the risks of taking HT? · Some women who take HT may have vaginal bleeding, bloating, nausea, sore breasts, mood swings, and headaches. Talk to your doctor about changing the type of HT you take or lowering the dose. This may help to end these side effects. · Taking HT may slightly increase your risk for heart disease, breast cancer, ovarian cancer, blood clots, and stroke. · You should not take HT if you:  ? Could be pregnant. ? Have a personal history of breast cancer, endometrial cancer, pulmonary embolism, deep vein thrombosis, heart attack, or stroke. ? Have vaginal bleeding from an unknown cause. ? Have active liver disease. What can you do to reduce the symptoms of menopause? · Eat healthy foods and get regular exercise. This also will help to maintain strong bones and a healthy heart. · Do not smoke. If you smoke, you can reduce hot flashes and long-term health risks by stopping.  If you need help quitting, talk to your doctor about stop-smoking programs and medicines. These can increase your chances of quitting for good. · Practice daily breathing exercises (meditation) to reduce hot flashes and mood swings. · Limit the amount of alcohol you drink. This can reduce symptoms of menopause and long-term health risks. · Keep your home and office cool. · Use a vaginal lubricant, such as Astroglide, Wet Gel Lubricant, or K-Y Jelly. · Do pelvic floor (Kegel) exercises, which tighten and strengthen pelvic muscles. To do Kegel exercises:  ? Squeeze the same muscles you would use to stop your urine. Your belly and thighs should not move. ? Hold the squeeze for 3 seconds, then relax for 3 seconds. ? Start with 3 seconds. Then add 1 second each week until you are able to squeeze for 10 seconds. ? Repeat the exercise 10 to 15 times a session. Do three or more sessions a day. Where can you learn more? Go to https://DotAlign.Five Delta. org and sign in to your CH[employer]e account. Enter [id number] [id number] in the [employer]ry box to learn more about \"Hormone Therapy (HT): Care Instructions. \"     If you do not have an account, please click on the \"Sign Up Now\" link. Current as of: February 19, 2019  Content Version: 12.1  © 5211-8050 Healthwise[employer] Incorporated. Care instructions adapted under license by [facility] ([facility]). If you have questions about a medical condition or this instruction, always ask your healthcare professional. [place] [address] any warranty or liability for your use of this information. Patient Education        Fainting: Care Instructions  Your Care Instructions    When you faint, or pass out, you lose consciousness for a short time. A brief drop in blood flow to the brain often causes it. When you fall or lie down, more blood flows to your brain and you regain consciousness. Emotional stress, pain, or overheating--especially if you have been standing--can make you faint.  In these cases, fainting is usually not serious. But fainting can be a sign of a more serious problem. Your doctor may want you to have more tests to rule out other causes. The treatment you need depends on the reason why you fainted. The doctor has checked you carefully, but problems can develop later. If you notice any problems or new symptoms, get medical treatment right away. Follow-up care is a key part of your treatment and safety. Be sure to make and go to all appointments, and call your doctor if you are having problems. It's also a good idea to know your test results and keep a list of the medicines you take. How can you care for yourself at home? · Drink plenty of fluids to prevent dehydration. If you have kidney, heart, or liver disease and have to limit fluids, talk with your doctor before you increase your fluid intake. When should you call for help? Call 911 anytime you think you may need emergency care. For example, call if:    · You have symptoms of a heart problem. These may include:  ? Chest pain or pressure. ? Severe trouble breathing. ? A fast or irregular heartbeat. ? Lightheadedness or sudden weakness. ? Coughing up pink, foamy mucus. ? Passing out. After you call 911, the  may tell you to chew 1 adult-strength or 2 to 4 low-dose aspirin. Wait for an ambulance. Do not try to drive yourself.     · You have symptoms of a stroke. These may include:  ? Sudden numbness, tingling, weakness, or loss of movement in your face, arm, or leg, especially on only one side of your body. ? Sudden vision changes. ? Sudden trouble speaking. ? Sudden confusion or trouble understanding simple statements. ? Sudden problems with walking or balance. ? A sudden, severe headache that is different from past headaches.     · You passed out (lost consciousness) again.    Watch closely for changes in your health, and be sure to contact your doctor if:    · You do not get better as expected. Where can you learn more?   Go to https://chpepiceweb.[email or web address] org and sign in to your medidametricshart account. Enter U088 in the [facility] box to learn more about \"Fainting: Care Instructions. \"     If you do not have an account, please click on the \"Sign Up Now\" link. Current as of: September 23, 2018  Content Version: 12.1  © 1413-3776 Healthwise, [employer]. Care instructions adapted under license by [facility] ([facility]). If you have questions about a medical condition or this instruction, always ask your healthcare professional. [place] [address] any warranty or liability for your use of this information. Patient voices understanding and agrees to plans along with risks and benefits of plan. Counseling:  Saman Dong's case, medications and options werediscussed in detail. Patient was instructed to call the office if she   questions regarding her treatment. Should her conditions worsen, she should return to office to be reassessed byDr. Malcolm Bernardo. she  Should to go the closest Emergency Department for any emergency. They verbalized understanding the above instructions. Return in about 2 months (around 2/19/2020) for ECPE, menopausal symptoms.

## 2019-12-19 NOTE — PATIENT INSTRUCTIONS
Patient Education        Painful Sex: Care Instructions  Your Care Instructions    Painful sex can be caused by many things. You may have an injury, an infection, or a growth in your vagina. Or maybe you have muscle spasms. In some cases, the pain is caused by another medical condition, such as a spinal problem. Some medicines can cause dryness in the vagina. And as a woman gets older, her vagina gets drier. It may also narrow, shorten, and get stiffer. This dryness can make sex painful. Talk to your doctor about what might be causing your painful sex. Treatment may help. Follow-up care is a key part of your treatment and safety. Be sure to make and go to all appointments, and call your doctor if you are having problems. It's also a good idea to know your test results and keep a list of the medicines you take. How can you care for yourself at home? · Use a vaginal lubricant during sex. Examples are Astroglide, K-Y Jelly, and Wet Gel Lubricant. · Increase the time you and your partner spend touching each other before sex. This is called foreplay. · Try different positions for sex to find the most comfortable ones. · Ask your doctor about exercises to strengthen and relax your pelvic muscles. · Before sex, take a warm bath. This can relax you and reduce anxiety. · If your doctor prescribes any medicines, take them exactly as prescribed. Call your doctor if you think you are having a problem with your medicine. When should you call for help? Watch closely for changes in your health, and be sure to contact your doctor if you have any problems. Where can you learn more? Go to https://imelda.Optimus3. org and sign in to your [employer] account. Enter Y191 in the [employer] box to learn more about \"Painful Sex: Care Instructions. \"     If you do not have an account, please click on the \"Sign Up Now\" link.   Current as of: February 19, 2019  Content Version: 12.1  © 9348-8833 Healthwise, Incorporated. Care instructions adapted under license by [facility] ([facility]). If you have questions about a medical condition or this instruction, always ask your healthcare professional. [address] any warranty or liability for your use of this information. Patient Education        Hot Flashes During Menopause: Care Instructions  Your Care Instructions    A hot flash is a sudden feeling of intense body heat. Your head, neck, and chest may get red. Your heartbeat may speed up, and you may feel anxious or irritable. You may find that hot flashes occur more often in warm rooms or during stressful times. Hot flashes and other symptoms are a normal response to the hormone changes that occur before your menstrual cycle goes away completely (menopause). Hot flashes often get better and go away with time. Making a few changes, such as exercising more, practicing meditation, quitting smoking, and drinking less alcohol, can help. Some women take hormone pills or other medicine to treat bothersome symptoms. Follow-up care is a key part of your treatment and safety. Be sure to make and go to all appointments, and call your doctor if you are having problems. It's also a good idea to know your test results and keep a list of the medicines you take. How can you care for yourself at home? · If you decide to take medicine to treat hot flashes, take it exactly as prescribed. Call your doctor if you think you are having a problem with your medicine. You will get more details on the specific medicine your doctor prescribes. · Learn to meditate. Sit quietly and focus on your breathing. Try to practice each day. Books, classes, and tapes can help you start a program.  · Wear natural fabrics, such as cotton and silk. Dress in layers so you can take off clothes as needed. · Keep the room temperature cool, or use a fan.  You are more likely to have a hot flash when you are too warm than when you are cool.  · Use fewer blankets when you sleep at night. · Drink cold fluids rather than hot ones. Limit your intake of caffeine and alcohol. · Eat smaller meals more often during the day so your body makes less heat than when digesting large amounts of food. Eat low-fat and high-fiber foods. · Do not smoke. Smoking can make hot flashes worse. If you need help quitting, talk to your doctor about stop-smoking programs and medicines. These can increase your chances of quitting for good. · Get at least 30 minutes of exercise on most days of the week. Walking is a good choice. You also may want to do other activities, such as running, swimming, cycling, or playing tennis or team sports. Where can you learn more? Go to https://Proposifypeedupristine.IntroFly. org and sign in to your [employer] account. Enter F700 in the [employer] box to learn more about \"Hot Flashes During Menopause: Care Instructions. \"     If you do not have an account, please click on the \"Sign Up Now\" link. Current as of: February 19, 2019  Content Version: 12.1  © 3335-7026 [employer]. Care instructions adapted under license by [facility] ([facility]). If you have questions about a medical condition or this instruction, always ask your healthcare professional. [place] [address] any warranty or liability for your use of this information. Patient Education        Preventing Osteoporosis: Care Instructions  Your Care Instructions    Osteoporosis means the bones are weak and thin enough that they can break easily. The older you are, the more likely you are to get osteoporosis. But with plenty of calcium, vitamin D, and exercise, you can help prevent osteoporosis. The preteen and teen years are a key time for bone building. With the help of calcium, vitamin D, and exercise in those early years and beyond, the bones reach their peak density and strength by age 27.  After age 27, your bones naturally start to thin and menopause. Most women get relief from these symptoms within weeks of starting HT. HT contains two female hormones, estrogen and progestin. HT may come in the form of a pill, patch, gel, spray, or vaginal ring. A vaginal cream or a vaginal ring that has a much lower dose of estrogen may be used to relieve vaginal dryness only. HT has some risks. Most doctors recommend that women only take HT for as short a time as possible. This is to reduce the chances of heart disease, breast cancer, blood clots, and stroke that may be connected to HT. Be sure to have regular checkups with your doctor when taking HT. Talk with your doctor about whether HT is right for you. If you decide that the benefits of HT outweigh the risks, ask your doctor to prescribe the lowest effective dose for as short a time as possible. Follow-up care is a key part of your treatment and safety. Be sure to make and go to all appointments, and call your doctor if you are having problems. It's also a good idea to know your test results and keep a list of the medicines you take. Why might you take HT?  · HT reduces symptoms of menopause. These include hot flashes, mood swings, and sleep problems. · The estrogen in HT helps to prevent thinning bones. And it may lower the chance of colon cancer. · HT helps keep the lining of the vagina moist and thick. This can reduce irritation. · HT helps protect against dental problems, such as tooth loss and gum disease. What are the risks of taking HT? · Some women who take HT may have vaginal bleeding, bloating, nausea, sore breasts, mood swings, and headaches. Talk to your doctor about changing the type of HT you take or lowering the dose. This may help to end these side effects. · Taking HT may slightly increase your risk for heart disease, breast cancer, ovarian cancer, blood clots, and stroke. · You should not take HT if you:  ? Could be pregnant. ?  Have a personal history of breast cancer, endometrial one side of your body. ? Sudden vision changes. ? Sudden trouble speaking. ? Sudden confusion or trouble understanding simple statements. ? Sudden problems with walking or balance. ? A sudden, severe headache that is different from past headaches.     · You passed out (lost consciousness) again.    Watch closely for changes in your health, and be sure to contact your doctor if:    · You do not get better as expected. Where can you learn more? Go to https://Secret LabpeCoAxiaeb.Versant Online Solutions. org and sign in to your [employer] account. Enter Y655 in the tvCompass box to learn more about \"Fainting: Care Instructions. \"     If you do not have an account, please click on the \"Sign Up Now\" link. Current as of: September 23, 2018  Content Version: 12.1  © 7936-8336 Healthwise, Incorporated. Care instructions adapted under license by [facility] ([facility]). If you have questions about a medical condition or this instruction, always ask your healthcare professional. [place] [address] any warranty or liability for your use of this information.

## 2019-12-20 LAB
EKG P AXIS: 65 DEGREES
EKG P-R INTERVAL: 162 MS
EKG Q-T INTERVAL: 404 MS
EKG QRS DURATION: 84 MS
EKG QTC CALCULATION (BAZETT): 419 MS
EKG T AXIS: 47 DEGREES

## 2019-12-20 PROCEDURE — 93010 ELECTROCARDIOGRAM REPORT: CPT | Performed by: INTERNAL MEDICINE

## 2019-12-24 LAB
SEX HORMONE BINDING GLOBULIN: 73 NMOL/L (ref 30–135)
TESTOSTERONE FREE: 0.8 PG/ML (ref 1.1–5.8)
TESTOSTERONE, FEMALES/CHILDREN: 8 NG/DL (ref 9–55)

## 2019-12-27 ENCOUNTER — TELEPHONE (OUTPATIENT)
Dept: FAMILY MEDICINE CLINIC | Age: 49
End: 2019-12-27

## 2019-12-27 ENCOUNTER — HOSPITAL ENCOUNTER (OUTPATIENT)
Dept: VASCULAR LAB | Age: 49
Discharge: HOME | End: 2019-12-27
Payer: OTHER GOVERNMENT

## 2019-12-27 DIAGNOSIS — R55 SYNCOPE, UNSPECIFIED SYNCOPE TYPE: ICD-10-CM

## 2019-12-27 PROCEDURE — 93880 EXTRACRANIAL BILAT STUDY: CPT

## 2020-01-02 ASSESSMENT — ENCOUNTER SYMPTOMS
DIARRHEA: 0
VOMITING: 0
SINUS PRESSURE: 0
RHINORRHEA: 0
ABDOMINAL PAIN: 0
EYE DISCHARGE: 0
SHORTNESS OF BREATH: 0
WHEEZING: 0
SORE THROAT: 0
BLOOD IN STOOL: 0
CHEST TIGHTNESS: 0
COLOR CHANGE: 0
VOICE CHANGE: 0
COUGH: 0
EYE PAIN: 0
EYE REDNESS: 0

## 2020-01-08 ENCOUNTER — OFFICE VISIT (OUTPATIENT)
Dept: FAMILY MEDICINE CLINIC | Age: 50
End: 2020-01-08
Payer: OTHER GOVERNMENT

## 2020-01-08 VITALS
HEIGHT: 64 IN | OXYGEN SATURATION: 98 % | WEIGHT: 132.6 LBS | BODY MASS INDEX: 22.64 KG/M2 | TEMPERATURE: 97.9 F | DIASTOLIC BLOOD PRESSURE: 70 MMHG | HEART RATE: 68 BPM | SYSTOLIC BLOOD PRESSURE: 118 MMHG

## 2020-01-08 LAB — ESTROGEN TOTAL: 45 NG/ML

## 2020-01-08 PROCEDURE — 99396 PREV VISIT EST AGE 40-64: CPT | Performed by: INTERNAL MEDICINE

## 2020-01-08 ASSESSMENT — ENCOUNTER SYMPTOMS
EYE PAIN: 0
BLOOD IN STOOL: 0
VOICE CHANGE: 0
EYE DISCHARGE: 0
EYE REDNESS: 0
COUGH: 0
RHINORRHEA: 0
SHORTNESS OF BREATH: 0
ABDOMINAL PAIN: 0
COLOR CHANGE: 0
DIARRHEA: 0
SINUS PRESSURE: 0
SORE THROAT: 0
CHEST TIGHTNESS: 0
WHEEZING: 0
VOMITING: 0

## 2020-01-08 NOTE — PATIENT INSTRUCTIONS
IP Physical Therapy Treatment  Plan of Care Note    Assessment: Patient presents below baseline which was modified independent with mobility . Emphasis of session included gait training and transfers. Patient's overall level of function is minimal assist. Patient's response to treatment was well, demonstrating improved tolerance with mobility.  Continued skilled therapy is indicated to address bed mobility, transfers, gait training, stairs, and Functional activity tolerance, all to attain highest level of independence with functional mobility upon discharge. Patient is making progress toward goals.     PT Identified Barriers to Discharge: level of assist, weakness  Recommendation for Discharge: PT: SNF    Treatment Plan for Next Session: standing tolerance, strengthening     Below is key objective and subjective information from the last 24 hours.  For further details and goals, please refer to the PT Assess/Treat/Goals flowsheet.    Diagnosis:   1. Sepsis, due to unspecified organism (CMS/HCC)    2. Urinary tract infection with hematuria, site unspecified    3. Fall in home, initial encounter    4. Generalized weakness    5. Sepsis due to urinary tract infection (CMS/HCC)        Co-morbidities:   Patient Active Problem List   Diagnosis   • Kidney stones   • GERD (gastroesophageal reflux disease)   • Benign neoplasm of colon   • Hiatal hernia   • Diverticulosis of large intestine without hemorrhage   • Internal hemorrhoids   • ELIF (iron deficiency anemia)   • Dermatochalasis of both eyelids   • Pseudophakia of both eyes   • Primary osteoarthritis of both knees   • PVD (posterior vitreous detachment), left eye   • Prediabetes   • Community acquired pneumonia   • Sepsis due to urinary tract infection (CMS/HCC)   • Microscopic hematuria   • Personal history of kidney stones   • Generalized weakness   • Fall at home       Precautions: Precautions  Other Precautions: FALL RISK (07/03/18 6105)    Prior Living  Situation:  Type of Home: House (07/03/18 1012)  Lives With: Spouse (07/03/18 1012)    Bed Mobility:       Transfers:  Transfers  Sit to Stand: Minimal Assist (Min) (07/05/18 0914)  Stand to Sit: Minimal Assist (Min) (07/05/18 0914)  Assistive Device/: 2-wheeled walker (07/05/18 0914)  Transfer Comments 1: cues for hand placement and control with mobility (07/05/18 0914)      Gait:  Gait  Gait Assistance: Minimal Assist (Min) (07/05/18 0914)  Assistive Device/: 2-wheeled walker;1 Person;Gait Belt (07/05/18 0914)  Ambulation Distance (Feet): 75 Feet (07/05/18 0914)  Ambulation Surface: Tile (07/05/18 0914)  Gait Comments 1: patient ambualting two laps around room, no loss of balance  (07/05/18 0914)       Stairs:       Exercises:        Neuromuscular Re-education:       Goals:  Short Term Goals to Be Reviewed On: 07/09/18 (07/03/18 1012)  Short Term Goals = Discharge Goals: Yes (07/03/18 1012)  Goal Agreement: Patient agrees with goals and treatment plan (07/03/18 1012)  Bed Mobility Discharge Goal: Mod I from flat bed (07/03/18 1012)  Bed Mobility Discharge Goal Progress: Outcome not met, continue to monitor (07/03/18 1425)  Transfer Discharge Goal: Mod I from all surfaces with least restrictive device (07/03/18 1012)  Transfer Discharge Goal Progress: Outcome not met, continue to monitor (07/05/18 0914)  Ambulation Discharge Goal: mod I for 150 feet to allow pt to reah all areas of home at one time (07/03/18 1012)  Ambulation Discharge Goal Progress: Outcome not met, continue to monitor (07/05/18 0914)    Education:   On this date, the patient was educated on pacing with mobility.    The response to education was: Needs reinforcement.    Equipment:  PT/OT Mobility Equipment for Discharge: pt owns 4WW and standard walker (07/03/18 1012)  PT/OT ADL Equipment for Discharge: Continue to assess (07/03/18 1340)    Interventions and Treatment Time:  Treatment/Interventions: Functional transfer  training;Strengthening;Endurance training;Bed mobility;Gait training;Safety Education;Neuromuscular re-education (07/05/18 0914)  PT Time Spent: 16 minutes (07/05/18 0914)     Version: 12.3  © 3444-6793 Healthwise, Incorporated. Care instructions adapted under license by [facility] ([facility]). If you have questions about a medical condition or this instruction, always ask your healthcare professional. [address] any warranty or liability for your use of this information.

## 2020-01-09 DIAGNOSIS — Z12.4 CERVICAL CANCER SCREENING: ICD-10-CM

## 2020-09-08 ENCOUNTER — HOSPITAL ENCOUNTER (OUTPATIENT)
Dept: WOMENS IMAGING | Age: 50
Discharge: HOME | End: 2020-09-08
Payer: OTHER GOVERNMENT

## 2020-09-08 PROCEDURE — 77063 BREAST TOMOSYNTHESIS BI: CPT

## 2020-10-09 PROBLEM — K86.2 PANCREATIC CYST: Status: ACTIVE | Noted: 2020-10-09

## 2020-10-09 PROBLEM — Q45.3 PANCREAS DIVISUM: Status: ACTIVE | Noted: 2020-10-09

## 2020-10-15 ENCOUNTER — NURSE ONLY (OUTPATIENT)
Dept: FAMILY MEDICINE CLINIC | Age: 50
End: 2020-10-15
Payer: OTHER GOVERNMENT

## 2020-10-15 PROCEDURE — 90471 IMMUNIZATION ADMIN: CPT | Performed by: INTERNAL MEDICINE

## 2020-10-15 PROCEDURE — 90686 IIV4 VACC NO PRSV 0.5 ML IM: CPT | Performed by: INTERNAL MEDICINE

## 2020-10-15 NOTE — PROGRESS NOTES
After obtaining consent, and per orders of Dr. Enmanuel Bernal, injection of Afluria given in Left deltoid by Nurys Bassett. Patient instructed to remain in clinic for 20 minutes afterwards, and to report any adverse reaction to me immediately.

## 2021-01-10 ENCOUNTER — TELEPHONE (OUTPATIENT)
Dept: FAMILY MEDICINE CLINIC | Age: 51
End: 2021-01-10

## 2021-01-11 NOTE — TELEPHONE ENCOUNTER
Patient is still on schedule Monday at 8:45 for in person physical but I am home doing tele-health visits until the afternoon. She also has not had her labs done. I went ahead and sent her a My Chart message late Sunday night when I saw her still on schedule but not sure she will see it in time. Please call ASAP Monday morning and I will work with her schedule to try and get her rescheduled in a timely manner.

## 2021-03-22 ENCOUNTER — TELEPHONE (OUTPATIENT)
Dept: FAMILY MEDICINE CLINIC | Age: 51
End: 2021-03-22

## 2021-03-22 DIAGNOSIS — Z00.00 ANNUAL PHYSICAL EXAM: ICD-10-CM

## 2021-03-22 DIAGNOSIS — E55.9 AVITAMINOSIS D: Primary | ICD-10-CM

## 2021-03-22 DIAGNOSIS — R23.2 HOT FLASHES: ICD-10-CM

## 2021-03-22 DIAGNOSIS — N95.1 MENOPAUSAL SYMPTOMS: ICD-10-CM

## 2021-03-22 NOTE — TELEPHONE ENCOUNTER
I would need a diagnosis or reason/symptoms that she is having to order estrogen and progesterone levels. Please get more information from patient.

## 2021-03-22 NOTE — TELEPHONE ENCOUNTER
The patient called and asked if she can have an estrogen progesterone lab added for her upcoming appointment.

## 2021-03-22 NOTE — TELEPHONE ENCOUNTER
I spoke with the patient and she said that in December 2020 the labs had been done. She would like to compare the labs and she is also having night sweats, hot flashes and not sleeping.

## 2021-03-25 DIAGNOSIS — E55.9 AVITAMINOSIS D: ICD-10-CM

## 2021-03-25 DIAGNOSIS — Z00.00 ANNUAL PHYSICAL EXAM: ICD-10-CM

## 2021-03-25 DIAGNOSIS — R23.2 HOT FLASHES: ICD-10-CM

## 2021-03-25 DIAGNOSIS — N95.1 MENOPAUSAL SYMPTOMS: ICD-10-CM

## 2021-03-25 LAB
ALBUMIN SERPL-MCNC: 4.4 G/DL (ref 3.5–5.2)
ALP BLD-CCNC: 95 U/L (ref 35–104)
ALT SERPL-CCNC: 20 U/L (ref 5–33)
ANION GAP SERPL CALCULATED.3IONS-SCNC: 10 MMOL/L (ref 7–19)
AST SERPL-CCNC: 20 U/L (ref 5–32)
BASOPHILS ABSOLUTE: 0 K/UL (ref 0–0.2)
BASOPHILS RELATIVE PERCENT: 0.8 % (ref 0–1)
BILIRUB SERPL-MCNC: <0.2 MG/DL (ref 0.2–1.2)
BUN BLDV-MCNC: 15 MG/DL (ref 6–20)
CALCIUM SERPL-MCNC: 9.4 MG/DL (ref 8.6–10)
CHLORIDE BLD-SCNC: 104 MMOL/L (ref 98–111)
CHOLESTEROL, TOTAL: 234 MG/DL (ref 160–199)
CO2: 28 MMOL/L (ref 22–29)
CREAT SERPL-MCNC: 0.6 MG/DL (ref 0.5–0.9)
EOSINOPHILS ABSOLUTE: 0.1 K/UL (ref 0–0.6)
EOSINOPHILS RELATIVE PERCENT: 2.3 % (ref 0–5)
GFR AFRICAN AMERICAN: >59
GFR NON-AFRICAN AMERICAN: >60
GLUCOSE BLD-MCNC: 93 MG/DL (ref 74–109)
HCT VFR BLD CALC: 39.6 % (ref 37–47)
HDLC SERPL-MCNC: 98 MG/DL (ref 65–121)
HEMOGLOBIN: 12.3 G/DL (ref 12–16)
IMMATURE GRANULOCYTES #: 0 K/UL
LDL CHOLESTEROL CALCULATED: 124 MG/DL
LYMPHOCYTES ABSOLUTE: 2.1 K/UL (ref 1.1–4.5)
LYMPHOCYTES RELATIVE PERCENT: 40.7 % (ref 20–40)
MCH RBC QN AUTO: 30.7 PG (ref 27–31)
MCHC RBC AUTO-ENTMCNC: 31.1 G/DL (ref 33–37)
MCV RBC AUTO: 98.8 FL (ref 81–99)
MONOCYTES ABSOLUTE: 0.5 K/UL (ref 0–0.9)
MONOCYTES RELATIVE PERCENT: 9.7 % (ref 0–10)
NEUTROPHILS ABSOLUTE: 2.4 K/UL (ref 1.5–7.5)
NEUTROPHILS RELATIVE PERCENT: 46.3 % (ref 50–65)
PDW BLD-RTO: 12.1 % (ref 11.5–14.5)
PLATELET # BLD: 293 K/UL (ref 130–400)
PMV BLD AUTO: 9.8 FL (ref 9.4–12.3)
POTASSIUM SERPL-SCNC: 4.2 MMOL/L (ref 3.5–5)
PROGESTERONE LEVEL: 0.08 NG/ML
RBC # BLD: 4.01 M/UL (ref 4.2–5.4)
SODIUM BLD-SCNC: 142 MMOL/L (ref 136–145)
T4 FREE: 1.45 NG/DL (ref 0.93–1.7)
TOTAL PROTEIN: 7.2 G/DL (ref 6.6–8.7)
TRIGL SERPL-MCNC: 59 MG/DL (ref 0–149)
TSH SERPL DL<=0.05 MIU/L-ACNC: 1.94 UIU/ML (ref 0.27–4.2)
VITAMIN D 25-HYDROXY: 23.9 NG/ML
WBC # BLD: 5.2 K/UL (ref 4.8–10.8)

## 2021-03-28 LAB
SEX HORMONE BINDING GLOBULIN: 67 NMOL/L (ref 30–135)
TESTOSTERONE FREE: 0.8 PG/ML (ref 0.6–3.8)
TESTOSTERONE, FEMALES/CHILDREN: 8 NG/DL (ref 9–55)

## 2021-03-31 ENCOUNTER — OFFICE VISIT (OUTPATIENT)
Dept: FAMILY MEDICINE CLINIC | Age: 51
End: 2021-03-31
Payer: OTHER GOVERNMENT

## 2021-03-31 VITALS
BODY MASS INDEX: 23.41 KG/M2 | HEART RATE: 88 BPM | OXYGEN SATURATION: 98 % | WEIGHT: 132.13 LBS | SYSTOLIC BLOOD PRESSURE: 118 MMHG | DIASTOLIC BLOOD PRESSURE: 76 MMHG | TEMPERATURE: 97.8 F | HEIGHT: 63 IN

## 2021-03-31 DIAGNOSIS — N95.1 MENOPAUSAL SYMPTOMS: ICD-10-CM

## 2021-03-31 DIAGNOSIS — E55.9 AVITAMINOSIS D: ICD-10-CM

## 2021-03-31 DIAGNOSIS — Z11.59 ENCOUNTER FOR HEPATITIS C SCREENING TEST FOR LOW RISK PATIENT: ICD-10-CM

## 2021-03-31 DIAGNOSIS — N36.1 URETHRAL DIVERTICULUM: ICD-10-CM

## 2021-03-31 DIAGNOSIS — Z00.00 ANNUAL PHYSICAL EXAM: Primary | ICD-10-CM

## 2021-03-31 PROCEDURE — 99396 PREV VISIT EST AGE 40-64: CPT | Performed by: INTERNAL MEDICINE

## 2021-03-31 PROCEDURE — 99213 OFFICE O/P EST LOW 20 MIN: CPT | Performed by: INTERNAL MEDICINE

## 2021-03-31 RX ORDER — ERGOCALCIFEROL 1.25 MG/1
50000 CAPSULE ORAL WEEKLY
Qty: 12 CAPSULE | Refills: 3 | Status: SHIPPED | OUTPATIENT
Start: 2021-03-31 | End: 2021-09-26

## 2021-03-31 ASSESSMENT — ENCOUNTER SYMPTOMS
SHORTNESS OF BREATH: 0
SORE THROAT: 0
SINUS PRESSURE: 0
VOMITING: 0
DIARRHEA: 0
EYE REDNESS: 0
EYE DISCHARGE: 0
VOICE CHANGE: 0
COUGH: 0
EYE PAIN: 0
RHINORRHEA: 0
ABDOMINAL PAIN: 0
WHEEZING: 0
BLOOD IN STOOL: 0
CHEST TIGHTNESS: 0
COLOR CHANGE: 0

## 2021-03-31 ASSESSMENT — VISUAL ACUITY: OU: 1

## 2021-03-31 NOTE — PROGRESS NOTES
Yosi Aguilar is a 46 y.o. female who presents today for   Chief Complaint   Patient presents with    Annual Exam       HPI  47 y/o WF here for annual wellness. Patient has been more tired and has not had much energy but she is having a lot of hot flashes, night sweats, and not sleeping well for over a year and a half and worsening. She has not been as active and she has stopped going to [employer]. She did make herself get up and exercise this morning which made her feel better. She is followed by GI in [place], [place] for history of Crohn's disease which has been stable. Patient had an MRI of Pelvis for her Crohn's disease in May 2019 at [facility] which incidentally showed a small curvilinear periurethral cystic lesions thought to be possible urethral diverticula vs Kirill's cyst. Her GI physician recommended further evaluation. No issues with recurrent UTIs. Review of Systems   Constitutional: Negative for appetite change, chills, fatigue and fever. HENT: Negative for ear pain, rhinorrhea, sinus pressure, sore throat and voice change. Eyes: Negative for pain, discharge and redness. Respiratory: Negative for cough, chest tightness, shortness of breath and wheezing. Cardiovascular: Negative for chest pain and palpitations. Gastrointestinal: Negative for abdominal pain, blood in stool, diarrhea and vomiting. Endocrine: Positive for heat intolerance. Negative for cold intolerance and polydipsia. See HPI   Genitourinary: Negative for dysuria and hematuria. Musculoskeletal: Negative for arthralgias, myalgias, neck pain and neck stiffness. Skin: Negative for color change and rash. Neurological: Negative for dizziness, tremors, syncope, speech difficulty, weakness, numbness and headaches. Hematological: Negative for adenopathy. Does not bruise/bleed easily. Psychiatric/Behavioral: Negative for confusion, dysphoric mood and sleep disturbance. The patient is not nervous/anxious. All other systems reviewed and are negative. Past Medical History:   Diagnosis Date    Acute gastritis     Allergic rhinitis     Basal cell carcinoma     Chondromalacia of knee     Degenerative disc disease, lumbar 9/18/2017    Depression with anxiety     Dermoid cyst of leg     GERD (gastroesophageal reflux disease)     IBS (irritable bowel syndrome)     Lower back pain     Lumbar radiculopathy     Muscle spasm of back     Seasonal affective disorder in remission ([facility] [address] 9/18/2017    Thoracic outlet syndrome     Uterine leiomyoma        Current Outpatient Medications   Medication Sig Dispense Refill    vitamin D (ERGOCALCIFEROL) 1.25 MG (51189 UT) CAPS capsule Take 1 capsule by mouth once a week 12 capsule 3    omeprazole (PRILOSEC) 40 MG delayed release capsule Take 40 mg by mouth daily      sertraline (ZOLOFT) 50 MG tablet Take 50 mg by mouth daily      PENTASA 500 MG extended release capsule        No current facility-administered medications for this visit. No Known Allergies    Past Surgical History:   Procedure Laterality Date    ARTHROPLASTY      BREAST ENHANCEMENT SURGERY      COSMETIC SURGERY      HYSTERECTOMY, TOTAL ABDOMINAL  2005    uterine fibroids    MOHS SURGERY      upper right extremity       Social History     Tobacco Use    Smoking status: Former Smoker    Smokeless tobacco: Never Used   Substance Use Topics    Alcohol use: Yes     Comment: rarely    Drug use: No       Family History   Problem Relation Age of Onset    Depression Mother         MDD with Suicide    High Blood Pressure Father     Heart Disease Father         a-fib    Other Father         DVT    High Blood Pressure Brother     Colon Cancer Maternal Grandfather        /76   Pulse 88   Temp 97.8 °F (36.6 °C)   Ht 5' 3\" (1.6 m)   Wt 132 lb 2 oz (59.9 kg)   LMP 05/20/2017   SpO2 98%   BMI 23.40 kg/m²     Physical Exam  Vitals signs and nursing note reviewed.    Constitutional: General: She is not in acute distress. Appearance: Normal appearance. She is well-developed, well-groomed and normal weight. She is not ill-appearing, toxic-appearing or diaphoretic. HENT:      Head: Normocephalic and atraumatic. Right Ear: Tympanic membrane, ear canal and external ear normal.      Left Ear: Tympanic membrane, ear canal and external ear normal.      Nose: Nose normal.      Mouth/Throat:      Lips: Pink. Mouth: Mucous membranes are moist. No oral lesions. Tongue: No lesions. Palate: No mass and lesions. Pharynx: Oropharynx is clear. Uvula midline. No pharyngeal swelling, oropharyngeal exudate, posterior oropharyngeal erythema or uvula swelling. Tonsils: 1+ on the right. 1+ on the left. Eyes:      General: Lids are normal. Vision grossly intact. No scleral icterus. Extraocular Movements: Extraocular movements intact. Conjunctiva/sclera: Conjunctivae normal.      Pupils: Pupils are equal, round, and reactive to light. Neck:      Musculoskeletal: Normal range of motion and neck supple. Thyroid: No thyroid mass or thyromegaly. Vascular: No carotid bruit or JVD. Trachea: Trachea and phonation normal.   Cardiovascular:      Rate and Rhythm: Normal rate and regular rhythm. Pulses: Normal pulses. Radial pulses are 2+ on the right side and 2+ on the left side. Posterior tibial pulses are 2+ on the right side and 2+ on the left side. Heart sounds: Normal heart sounds. No murmur. No friction rub. No gallop. Pulmonary:      Effort: Pulmonary effort is normal. No accessory muscle usage. Breath sounds: Normal breath sounds. No decreased breath sounds, wheezing, rhonchi or rales. Chest:      Breasts: Breasts are symmetrical.         Right: Normal.         Left: Normal.      Comments: +bilateral breast implants  Abdominal:      General: Abdomen is flat. Bowel sounds are normal. There is no distension. Palpations: Abdomen is soft. There is no hepatomegaly, splenomegaly or mass. Tenderness: There is no abdominal tenderness. There is no guarding or rebound. Hernia: No hernia is present. Musculoskeletal: Normal range of motion. Right wrist: Normal.      Left wrist: Normal.      Right ankle: Normal.      Left ankle: Normal.      Right lower leg: No edema. Left lower leg: No edema. Lymphadenopathy:      Head:      Right side of head: No submandibular adenopathy. Left side of head: No submandibular adenopathy. Cervical: No cervical adenopathy. Right cervical: No superficial, deep or posterior cervical adenopathy. Left cervical: No superficial, deep or posterior cervical adenopathy. Upper Body:      Right upper body: No supraclavicular, axillary or pectoral adenopathy. Left upper body: No supraclavicular, axillary or pectoral adenopathy. Lower Body: No right inguinal adenopathy. No left inguinal adenopathy. Skin:     General: Skin is warm and dry. Capillary Refill: Capillary refill takes less than 2 seconds. Coloration: Skin is not cyanotic. Findings: No rash. Nails: There is no clubbing. Neurological:      Mental Status: She is alert and oriented to person, place, and time. Cranial Nerves: No cranial nerve deficit, dysarthria or facial asymmetry. Sensory: Sensation is intact. Motor: Motor function is intact. No weakness, tremor, atrophy or abnormal muscle tone. Coordination: Coordination is intact. Romberg sign negative. Coordination normal.      Gait: Gait is intact. Deep Tendon Reflexes: Reflexes are normal and symmetric. Reflex Scores:       Brachioradialis reflexes are 2+ on the right side and 2+ on the left side. Patellar reflexes are 2+ on the right side and 2+ on the left side.      Comments: CN II-XII grossly intact, speech clear, MAEW, no focal deficits   Psychiatric:         Attention and Perception: Attention and perception normal.         Mood and Affect: Mood and affect normal.         Speech: Speech normal.         Behavior: Behavior normal. Behavior is cooperative. Thought Content: Thought content normal.         Cognition and Memory: Cognition and memory normal.         Judgment: Judgment normal.         Lab Results   Component Value Date    WBC 5.2 03/25/2021    HGB 12.3 03/25/2021    HCT 39.6 03/25/2021    MCV 98.8 03/25/2021     03/25/2021    LYMPHOPCT 40.7 (H) 03/25/2021    RBC 4.01 (L) 03/25/2021    MCH 30.7 03/25/2021    MCHC 31.1 (L) 03/25/2021    RDW 12.1 03/25/2021     Lab Results   Component Value Date     03/25/2021    K 4.2 03/25/2021     03/25/2021    CO2 28 03/25/2021    BUN 15 03/25/2021    CREATININE 0.6 03/25/2021    GLUCOSE 93 03/25/2021    CALCIUM 9.4 03/25/2021    PROT 7.2 03/25/2021    LABALBU 4.4 03/25/2021    BILITOT <0.2 03/25/2021    ALKPHOS 95 03/25/2021    AST 20 03/25/2021    ALT 20 03/25/2021    LABGLOM >60 03/25/2021    GFRAA >59 03/25/2021       Lab Results   Component Value Date    TSH 1.940 03/25/2021    T4FREE 1.45 03/25/2021     Lab Results   Component Value Date    CHOL 234 (H) 03/25/2021    CHOL 220 (H) 09/13/2018    CHOL 213 (H) 09/11/2017     Lab Results   Component Value Date    TRIG 59 03/25/2021    TRIG 58 09/13/2018    TRIG 85 (L) 09/11/2017     Lab Results   Component Value Date    HDL 98 03/25/2021    HDL 88 09/13/2018     09/11/2017     Lab Results   Component Value Date    LDLCALC 124 03/25/2021    LDLCALC 120 09/13/2018    [address] 09/11/2017     Lab Results   Component Value Date    VITD25 23.9 (L) 03/25/2021     Estrogen level-pending  No results found for this visit on 03/31/21. Assessment:    ICD-10-CM    1. Annual physical exam  Z00.00    2. Urethral diverticulum  N36.1 US PELVIS COMPLETE   3. Avitaminosis D  E55.9 vitamin D (ERGOCALCIFEROL) 1.25 MG (57964 UT) CAPS capsule     Vitamin D 25 Hydroxy   4. Menopausal symptoms  N95.1    5. Encounter for hepatitis C screening test for low risk patient  Z11.59 Hepatitis C Antibody       Plan:  Susanna Grimes was seen today for annual exam.    Diagnoses and all orders for this visit:    Annual physical exam    Urethral diverticulum  -     US PELVIS COMPLETE; Future    Avitaminosis D  -     vitamin D (ERGOCALCIFEROL) 1.25 MG (74494 UT) CAPS capsule; Take 1 capsule by mouth once a week  -     Vitamin D 25 Hydroxy; Future    Menopausal symptoms    Encounter for hepatitis C screening test for low risk patient  -     Hepatitis C Antibody; Future    1. Recent labs reviewed with patient. 2. Vitamin D deficiency-resume high dose vitamin D for treatment and will recheck vitamin D level in 3-6 months. 3. history of possible urethral diverticulum on MRI. Patient requests further work up so will schedule pelvic US but likely will need Urology evaluation and possible VCUG to evaluate further. 4. Menopausal symptoms-progesterone level low, testosterone level low, estrogen level pending. Patient is not sure if she would want to start prescription HRT. 5. Health Maintenance reviewed - breast exam completed today (advised of need to have breast exam in addition to mammogram), PAP smear up to date, mammogram up to date. Discussed risks and benefits of different COVID-19 vaccines including higher risk of complications and possible death due to covid-19 versus minimal risk of side effects with vaccines other than small risk of blood clots with Cruz Ache and Cruz Ache vaccine. Handout provided. 6.Return in about 1 year (around 3/31/2022) for ECPE. Over 50% of the total visit time of 40 min was spent on counseling and/or coordination of care of:   1. Annual physical exam    2. Urethral diverticulum    3. Avitaminosis D    4. Menopausal symptoms    5.  Encounter for hepatitis C screening test for low risk patient         Orders Placed This Encounter   Procedures    US PELVIS COMPLETE Standing Status:   Future     Standing Expiration Date:   3/31/2022     Order Specific Question:   Reason for exam:     Answer:   further evaluation of possible urethral diverticulum vs cyst    Vitamin D 25 Hydroxy     Standing Status:   Future     Standing Expiration Date:   3/31/2022    Hepatitis C Antibody     Standing Status:   Future     Standing Expiration Date:   3/31/2022     Orders Placed This Encounter   Medications    vitamin D (ERGOCALCIFEROL) 1.25 MG (29297 UT) CAPS capsule     Sig: Take 1 capsule by mouth once a week     Dispense:  12 capsule     Refill:  3     Medications Discontinued During This Encounter   Medication Reason    vitamin D (ERGOCALCIFEROL) 85837 units CAPS capsule REORDER     Patient Instructions     Patient Education        Well Visit, Women 48 to 72: Care Instructions  Overview     Well visits can help you stay healthy. Your doctor has checked your overall health and may have suggested ways to take good care of yourself. Your doctor also may have recommended tests. At home, you can help prevent illness with healthy eating, regular exercise, and other steps. Follow-up care is a key part of your treatment and safety. Be sure to make and go to all appointments, and call your doctor if you are having problems. It's also a good idea to know your test results and keep a list of the medicines you take. How can you care for yourself at home? · Get screening tests that you and your doctor decide on. Screening helps find diseases before any symptoms appear. · Eat healthy foods. Choose fruits, vegetables, whole grains, protein, and low-fat dairy foods. Limit fat, especially saturated fat. Reduce salt in your diet. · Limit alcohol. Have no more than 1 drink a day or 7 drinks a week. · Get at least 30 minutes of exercise on most days of the week. Walking is a good choice.  You also may want to do other activities, such as running, swimming, cycling, or playing tennis or team sports. · Reach and stay at a healthy weight. This will lower your risk for many problems, such as obesity, diabetes, heart disease, and high blood pressure. · Do not smoke. Smoking can make health problems worse. If you need help quitting, talk to your doctor about stop-smoking programs and medicines. These can increase your chances of quitting for good. · Care for your mental health. It is easy to get weighed down by worry and stress. Learn strategies to manage stress, like deep breathing and mindfulness, and stay connected with your family and community. If you find you often feel sad or hopeless, talk with your doctor. Treatment can help. · Talk to your doctor about whether you have any risk factors for sexually transmitted infections (STIs). You can help prevent STIs if you wait to have sex with a new partner (or partners) until you've each been tested for STIs. It also helps if you use condoms (male or female condoms) and if you limit your sex partners to one person who only has sex with you. Vaccines are available for some STIs. · If you think you may have a problem with alcohol or drug use, talk to your doctor. This includes prescription medicines (such as amphetamines and opioids) and illegal drugs (such as cocaine and methamphetamine). Your doctor can help you figure out what type of treatment is best for you. · Protect your skin from too much sun. When you're outdoors from 10 a.m. to 4 p.m., stay in the shade or cover up with clothing and a hat with a wide brim. Wear sunglasses that block UV rays. Even when it's cloudy, put broad-spectrum sunscreen (SPF 30 or higher) on any exposed skin. · See a dentist one or two times a year for checkups and to have your teeth cleaned. · Wear a seat belt in the car. When should you call for help? Watch closely for changes in your health, and be sure to contact your doctor if you have any problems or symptoms that concern you. Where can you learn more?   Go to https://Supponor.Novia CareClinics. org and sign in to your Netpulse account. Enter P109 in the [facility] box to learn more about \"Well Visit, Women 50 to 72: Care Instructions. \"     If you do not have an account, please click on the \"Sign Up Now\" link. Current as of: May 27, 2020               Content Version: 12.8  © 2006-2021 [employer]. Care instructions adapted under license by [facility]. If you have questions about a medical condition or this instruction, always ask your healthcare professional. [place] [address] any warranty or liability for your use of this information. Patient Education        Learning About High-Vitamin D Foods  What foods are high in vitamin D? The foods you eat contain nutrients, such as vitamins and minerals. Vitamin D is a nutrient. Your body needs the right amount to stay healthy and work as it should. You can use the list below to help you make choices about which foods to eat. Here are some foods that contain vitamin D. Fruits  · Orange juice, fortified with vitamin D  Grains  · Cereals, fortified with vitamin D  Dairy and dairy alternatives  · Milk, fortified with vitamin D  · Non-dairy milk (almond, rice, soy), fortified with vitamin D  · Yogurt, fortified with vitamin D  Protein foods  · Flounder  · Mackerel  · Sardines  · [place]  · Sole  · [place]  · [address] [facility]  · Cod liver oil  Work with your doctor to find out how much of this nutrient you need. Depending on your health, you may need more or less of it in your diet. Where can you learn more? Go to https://Supponor.[facility]. org and sign in to your Netpulse account. Enter [id number] [id number] in the [employer] box to learn more about \"Learning About High-Vitamin D Foods. \"     If you do not have an account, please click on the \"Sign Up Now\" link. Current as of: December 17, 2020               Content Version: 12.8  © 2006-2021 Healthwise, Inc[employer]. Care instructions adapted under license by [facility] ([facility]). If you have questions about a medical condition or this instruction, always ask your healthcare professional. [address] any warranty or liability for your use of this information. Patient Education        Learning About Healthy Eating During Menopause  Why is healthy eating important during menopause? Healthy eating is an important part of caring for yourself during menopause. It gives your body nutrients you need, like calcium and vitamin D. It helps you stay at a healthy weight. And it can reduce your risk for health problems that are more common after menopause, such as heart disease and osteoporosis. Eating healthy during menopause  Here are some things you can do to eat healthy during menopause. · Eat a heart-healthy diet. Choose foods like vegetables, fruits, nuts, beans, fish, and whole grains. Limit foods that have a lot of salt, fat, and sugar. · Choose foods that have a lot of calcium. These include milk, cheese, yogurt, and calcium-fortified orange juice, soy milk, and tofu. Other sources of calcium include canned sardines, canned salmon with bones, and leafy green vegetables such as broccoli, kale, and Chinese cabbage. Women between the ages of 23 and 48 need 1,000 milligrams (mg) of calcium a day. Women 51 and older need 1,200 mg a day. · Eat foods that are good sources of vitamin D.   Vitamin D helps your body use calcium. Foods that have vitamin D include salmon, tuna, and mackerel. Vitamin D-fortified foods like milk, soy milk, orange juice, and cereal are also good sources. Women between the ages of 23 and 79 need 0 international units (IU) of vitamin D a day. Women 71 and older need 800 IU a day. · Talk to your doctor about taking a calcium and vitamin D supplement. It may be a good idea for you if you don't get enough of these nutrients from the foods you eat. · Limit caffeine.    Caffeine can cause sleep problems. It can also make you feel anxious. If you are bothered by symptoms like these, pay attention to how much caffeine you are getting. Caffeine is found in coffee, tea, energy drinks, and foods and drinks that contain chocolate. · Limit your intake of alcohol. For some women, drinking may make menopause symptoms worse. Follow-up care is a key part of your treatment and safety. Be sure to make and go to all appointments, and call your doctor if you are having problems. It's also a good idea to know your test results and keep a list of the medicines you take. Where can you learn more? Go to https://Haileopepiceweb.Receept. org and sign in to your Onarbor account. Enter I341 in the [employer] box to learn more about \"Learning About Healthy Eating During Menopause. \"     If you do not have an account, please click on the \"Sign Up Now\" link. Current as of: December 17, 2020               Content Version: 12.8  © 2006-2021 [employer]. Care instructions adapted under license by [facility] ([facility]). If you have questions about a medical condition or this instruction, always ask your healthcare professional. [place] [address] any warranty or liability for your use of this information. Patient Education        Hot Flashes During Menopause: Care Instructions  Your Care Instructions     A hot flash is a sudden feeling of intense body heat. Your head, neck, and chest may get red. Your heartbeat may speed up, and you may feel anxious. You may find that hot flashes occur more often in warm rooms or during stressful times. Hot flashes and other symptoms are a normal response to the hormone changes that occur before your menstrual cycle goes away completely (menopause). Hot flashes often get better and go away with time. Some women take hormone pills or other medicine to treat bothersome symptoms.   Follow-up care is a key part of your treatment and safety. Be sure to make and go to all appointments, and call your doctor if you are having problems. It's also a good idea to know your test results and keep a list of the medicines you take. How can you care for yourself at home? · If you decide to take medicine to treat hot flashes, take it exactly as prescribed. Call your doctor if you think you are having a problem with your medicine. You will get more details on the specific medicine your doctor prescribes. · Learn to meditate. Sit quietly and focus on your breathing. Try to practice each day. Books, classes, and tapes can help you start a program.  · Wear natural fabrics, such as cotton and silk. Dress in layers so you can take off clothes as needed. · Keep the room temperature cool, or use a fan. You are more likely to have a hot flash when you are too warm than when you are cool. · Use fewer blankets when you sleep at night. · Drink cold fluids rather than hot ones. Limit your intake of caffeine and alcohol. · Eat smaller meals more often during the day so your body makes less heat than when digesting large amounts of food. Eat low-fat and high-fiber foods. · Do not smoke. Smoking can make hot flashes worse. If you need help quitting, talk to your doctor about stop-smoking programs and medicines. These can increase your chances of quitting for good. · Get at least 30 minutes of exercise on most days of the week. Walking is a good choice. You also may want to do other activities, such as running, swimming, cycling, or playing tennis or team sports. Where can you learn more? Go to https://imelda.Pomelo. org and sign in to your Ngt4u.inc account. Enter F700 in the [employer] box to learn more about \"Hot Flashes During Menopause: Care Instructions. \"     If you do not have an account, please click on the \"Sign Up Now\" link. Current as of: July 17, 2020               Content Version: 12.8  © 7492-8417 Healthwise, [employer]. Care instructions adapted under license by [facility] ([facility]). If you have questions about a medical condition or this instruction, always ask your healthcare professional. [address] any warranty or liability for your use of this information. Patient voices understanding and agrees to plans along with risks and benefits of plan. Counseling:  Dixie Davis Letitia's case, medications and options were discussed in detail. patient was instructed to call the office if she   questions regarding her treatment. Should her conditions worsen, she should return to office to be reassessed by Dr. Ren Abdul. she  Should to go the closest Emergency Department for any emergency. They verbalized understanding the above instructions.

## 2021-03-31 NOTE — PATIENT INSTRUCTIONS
(STIs). You can help prevent STIs if you wait to have sex with a new partner (or partners) until you've each been tested for STIs. It also helps if you use condoms (male or female condoms) and if you limit your sex partners to one person who only has sex with you. Vaccines are available for some STIs. · If you think you may have a problem with alcohol or drug use, talk to your doctor. This includes prescription medicines (such as amphetamines and opioids) and illegal drugs (such as cocaine and methamphetamine). Your doctor can help you figure out what type of treatment is best for you. · Protect your skin from too much sun. When you're outdoors from 10 a.m. to 4 p.m., stay in the shade or cover up with clothing and a hat with a wide brim. Wear sunglasses that block UV rays. Even when it's cloudy, put broad-spectrum sunscreen (SPF 30 or higher) on any exposed skin. · See a dentist one or two times a year for checkups and to have your teeth cleaned. · Wear a seat belt in the car. When should you call for help? Watch closely for changes in your health, and be sure to contact your doctor if you have any problems or symptoms that concern you. Where can you learn more? Go to https://PriceMatch.healthLaREDChina.com. org and sign in to your Zealify account. Enter C412 in the [facility] box to learn more about \"Well Visit, Women 50 to 72: Care Instructions. \"     If you do not have an account, please click on the \"Sign Up Now\" link. Current as of: May 27, 2020               Content Version: 12.8  © 2006-2021 Healthwise, Incorporated. Care instructions adapted under license by [facility] ([facility]). If you have questions about a medical condition or this instruction, always ask your healthcare professional. [place] [address] any warranty or liability for your use of this information. Patient Education        Learning About High-Vitamin D Foods  What foods are high in vitamin D?     The foods you eat contain nutrients, such as vitamins and minerals. Vitamin D is a nutrient. Your body needs the right amount to stay healthy and work as it should. You can use the list below to help you make choices about which foods to eat. Here are some foods that contain vitamin D. Fruits  · Orange juice, fortified with vitamin D  Grains  · Cereals, fortified with vitamin D  Dairy and dairy alternatives  · Milk, fortified with vitamin D  · Non-dairy milk (almond, rice, soy), fortified with vitamin D  · Yogurt, fortified with vitamin D  Protein foods  · Flounder  · Mackerel  · Sardines  · [place]  · Sole  · [place]  · [address]  · Cod liver oil  Work with your doctor to find out how much of this nutrient you need. Depending on your health, you may need more or less of it in your diet. Where can you learn more? Go to https://chpebillyeb.Science Behind Sweat. org and sign in to your opvizor account. Enter [id number] in the [employer] box to learn more about \"Learning About High-Vitamin D Foods. \"     If you do not have an account, please click on the \"Sign Up Now\" link. Current as of: December 17, 2020               Content Version: 12.8  © 2006-2021 Healthwise, Incorporated. Care instructions adapted under license by [facility] ([facility]). If you have questions about a medical condition or this instruction, always ask your healthcare professional. [place] [address] any warranty or liability for your use of this information. Patient Education        Learning About Healthy Eating During Menopause  Why is healthy eating important during menopause? Healthy eating is an important part of caring for yourself during menopause. It gives your body nutrients you need, like calcium and vitamin D. It helps you stay at a healthy weight. And it can reduce your risk for health problems that are more common after menopause, such as heart disease and osteoporosis.   Eating healthy during menopause  Here are some things you can do to eat healthy during menopause. · Eat a heart-healthy diet. Choose foods like vegetables, fruits, nuts, beans, fish, and whole grains. Limit foods that have a lot of salt, fat, and sugar. · Choose foods that have a lot of calcium. These include milk, cheese, yogurt, and calcium-fortified orange juice, soy milk, and tofu. Other sources of calcium include canned sardines, canned salmon with bones, and leafy green vegetables such as broccoli, kale, and Chinese cabbage. Women between the ages of 23 and 48 need 1,000 milligrams (mg) of calcium a day. Women 51 and older need 1,200 mg a day. · Eat foods that are good sources of vitamin D.   Vitamin D helps your body use calcium. Foods that have vitamin D include salmon, tuna, and mackerel. Vitamin D-fortified foods like milk, soy milk, orange juice, and cereal are also good sources. Women between the ages of 23 and 79 need 0 international units (IU) of vitamin D a day. Women 71 and older need 800 IU a day. · Talk to your doctor about taking a calcium and vitamin D supplement. It may be a good idea for you if you don't get enough of these nutrients from the foods you eat. · Limit caffeine. Caffeine can cause sleep problems. It can also make you feel anxious. If you are bothered by symptoms like these, pay attention to how much caffeine you are getting. Caffeine is found in coffee, tea, energy drinks, and foods and drinks that contain chocolate. · Limit your intake of alcohol. For some women, drinking may make menopause symptoms worse. Follow-up care is a key part of your treatment and safety. Be sure to make and go to all appointments, and call your doctor if you are having problems. It's also a good idea to know your test results and keep a list of the medicines you take. Where can you learn more? Go to https://imelda.healthCambridge Mobile Telematics. org and sign in to your Letsdecco account.  Enter B745 in the [facility] box to learn more about \"Learning About Healthy Eating During Menopause. \"     If you do not have an account, please click on the \"Sign Up Now\" link. Current as of: December 17, 2020               Content Version: 12.8  © 2006-2021 [email or web address]. Care instructions adapted under license by [facility] ([facility]). If you have questions about a medical condition or this instruction, always ask your healthcare professional. [place] [address] any warranty or liability for your use of this information. Patient Education        Hot Flashes During Menopause: Care Instructions  Your Care Instructions     A hot flash is a sudden feeling of intense body heat. Your head, neck, and chest may get red. Your heartbeat may speed up, and you may feel anxious. You may find that hot flashes occur more often in warm rooms or during stressful times. Hot flashes and other symptoms are a normal response to the hormone changes that occur before your menstrual cycle goes away completely (menopause). Hot flashes often get better and go away with time. Some women take hormone pills or other medicine to treat bothersome symptoms. Follow-up care is a key part of your treatment and safety. Be sure to make and go to all appointments, and call your doctor if you are having problems. It's also a good idea to know your test results and keep a list of the medicines you take. How can you care for yourself at home? · If you decide to take medicine to treat hot flashes, take it exactly as prescribed. Call your doctor if you think you are having a problem with your medicine. You will get more details on the specific medicine your doctor prescribes. · Learn to meditate. Sit quietly and focus on your breathing. Try to practice each day. Books, classes, and tapes can help you start a program.  · Wear natural fabrics, such as cotton and silk. Dress in layers so you can take off clothes as needed.   · Keep the room temperature cool, or use a fan. You are more likely to have a hot flash when you are too warm than when you are cool. · Use fewer blankets when you sleep at night. · Drink cold fluids rather than hot ones. Limit your intake of caffeine and alcohol. · Eat smaller meals more often during the day so your body makes less heat than when digesting large amounts of food. Eat low-fat and high-fiber foods. · Do not smoke. Smoking can make hot flashes worse. If you need help quitting, talk to your doctor about stop-smoking programs and medicines. These can increase your chances of quitting for good. · Get at least 30 minutes of exercise on most days of the week. Walking is a good choice. You also may want to do other activities, such as running, swimming, cycling, or playing tennis or team sports. Where can you learn more? Go to https://Sloka Telecomhugoeb.Proximus. org and sign in to your [employer] account. Enter F700 in the [employer] box to learn more about \"Hot Flashes During Menopause: Care Instructions. \"     If you do not have an account, please click on the \"Sign Up Now\" link. Current as of: July 17, 2020               Content Version: 12.8  © 2006-2021 Healthwise, [facility]. Care instructions adapted under license by [facility] ([facility]). If you have questions about a medical condition or this instruction, always ask your healthcare professional. [address] any warranty or liability for your use of this information.

## 2021-04-02 LAB — ESTROGEN TOTAL: NORMAL PG/ML

## 2021-04-07 ENCOUNTER — HOSPITAL ENCOUNTER (OUTPATIENT)
Dept: ULTRASOUND IMAGING | Age: 51
Discharge: HOME | End: 2021-04-07
Payer: OTHER GOVERNMENT

## 2021-04-07 DIAGNOSIS — N36.1 URETHRAL DIVERTICULUM: ICD-10-CM

## 2021-04-07 DIAGNOSIS — E55.9 AVITAMINOSIS D: ICD-10-CM

## 2021-04-07 DIAGNOSIS — Z11.59 ENCOUNTER FOR HEPATITIS C SCREENING TEST FOR LOW RISK PATIENT: ICD-10-CM

## 2021-04-07 LAB
HEPATITIS C ANTIBODY INTERPRETATION: NORMAL
VITAMIN D 25-HYDROXY: 21.8 NG/ML

## 2021-04-07 PROCEDURE — 76856 US EXAM PELVIC COMPLETE: CPT

## 2021-04-19 ENCOUNTER — TELEPHONE (OUTPATIENT)
Dept: FAMILY MEDICINE CLINIC | Age: 51
End: 2021-04-19

## 2021-04-19 NOTE — TELEPHONE ENCOUNTER
Pt returned call asking about US results. Informed pt of results and asked her if she would be ok seeing an urologist. She said that fine with her. Nicole Cleveland also wanted to know about her estrogen levels, if they were ok or not.

## 2021-04-19 NOTE — TELEPHONE ENCOUNTER
She did have her labs redrawn that same day I called her because she had an appt anyway so she said she would come right over.   She did not have a preference on the urologist.

## 2021-04-19 NOTE — TELEPHONE ENCOUNTER
I called the lab about the estrogen lab and was told that when the lab was redrawn on 4/7/21 the lab didn't know to run the estrogen level. Now the lab says that she will need a new order and the patient will have to come back in and have the lab redrawn again.

## 2021-04-19 NOTE — TELEPHONE ENCOUNTER
You called her on 4/7/21 to ask her to have the estrogen level redrawn per my request due to insufficient amount of blood on previous test but I have not seen a result yet. Did she get the blood redrawn?  Does she have a preference for Urologist?

## 2021-04-19 NOTE — TELEPHONE ENCOUNTER
We can refer to [facility] then and can you call the lab about the estrogen level? It may still be in progress but should be back by now.

## 2021-04-29 ENCOUNTER — TELEPHONE (OUTPATIENT)
Dept: FAMILY MEDICINE CLINIC | Age: 51
End: 2021-04-29

## 2021-04-29 NOTE — TELEPHONE ENCOUNTER
Yes, I think we are going to have to order it again and have it drawn to check the estrogen. I am not very happy with what happened when they said she needed to come back to have it redrawn and then kendal labs that were not due yet. We may need to talk to billing if her insurance does not want to cover those most recent labs. If she wants us to order it and she have it drawn at another lab, I would understand. Please apologize for the inconvenience.

## 2021-04-29 NOTE — TELEPHONE ENCOUNTER
Patient called in and asked if she needed to get her estrogen redrawn. The day she had her ultrasound she came back in to have it redrawn and it looks like they kendal labs that were supposed to be drawn at her upcoming appt in 6 months and did not get her estrogen. Please advise.

## 2021-04-30 DIAGNOSIS — N36.1 URETHRAL DIVERTICULUM: ICD-10-CM

## 2021-04-30 DIAGNOSIS — N95.1 MENOPAUSAL SYMPTOMS: Primary | ICD-10-CM

## 2021-04-30 NOTE — TELEPHONE ENCOUNTER
I spoke with Miss Miriam Rosa and apologized for the inconvince this has caused her. She has agreed to go back to [facility] and have the estrogen level redrawn. I have the new order in the computer. Miss Miriam Rosa asked if she needs a referral to Urology ?

## 2021-05-07 DIAGNOSIS — N95.1 MENOPAUSAL SYMPTOMS: ICD-10-CM

## 2021-05-17 LAB — ESTROGEN TOTAL: 31 PG/ML

## 2021-06-04 ASSESSMENT — ENCOUNTER SYMPTOMS
BACK PAIN: 0
EYE PAIN: 0
NAUSEA: 0
VOMITING: 0
SORE THROAT: 0
COUGH: 0
WHEEZING: 0

## 2021-06-04 NOTE — PROGRESS NOTES
Racquel Pinto is a 46 y.o. female who presents today   Chief Complaint   Patient presents with    New Patient     I am here for urinary diverticulum. I had a ultrasound done here at [address] [facility]. Urethral diverticulum  Patient is here for evaluation and urologic opinion regarding possible urethral diverticulum. She is 52-year-old female who had an MRI at [facility] for her Crohn's disease May 15, 2019. This showed a 1.5 cm peripherally enhancing cystic lesion in the pelvic floor in close proximity to the urethra and the anterior vaginal wall and felt this could represent a urethral diverticulum periurethral cyst or Kirill duct cyst.  That time she really was having no symptoms she denies any dribbling, dysuria, or discharge, she does have some pain with intercourse but she relates this to being postmenopausal.  She says the pain is on penetration. She denies any recurrent urinary tract infections. She does have some double voiding no stress incontinence she denies any palpable mass in the vagina. For the most part she is really asymptomatic. She had a MRI done on 5/30/2019 with contrast supposedly for urethral diverticulum protocol and this redemonstrated a periurethral cystic lesions largest measuring 1.1 cm along the left posterior lateral urethra and adjacent 6 mm cystic lesion containing slurring of debris causing mild mass-effect on the anterior left wall the vagina no suspicious enhancing component and a 4 mm cystic lesion along the right lateral wall was felt there was no communication with the urethra.   She has had a hysterectomy      Past Medical History:   Diagnosis Date    Acute gastritis     Allergic rhinitis     Basal cell carcinoma     Chondromalacia of knee     Degenerative disc disease, lumbar 9/18/2017    Depression with anxiety     Dermoid cyst of leg     GERD (gastroesophageal reflux disease)     IBS (irritable bowel syndrome)     Lower back pain     Lumbar radiculopathy     Muscle spasm of back     [facility]al [facility]/18/2017    Thoracic outlet syndrome     Uterine leiomyoma        Past Surgical History:   Procedure Laterality Date    ARTHROPLASTY      BREAST ENHANCEMENT SURGERY      COSMETIC SURGERY      HYSTERECTOMY, TOTAL ABDOMINAL  2005    uterine fibroids    MOHS SURGERY      upper right extremity       Current Outpatient Medications   Medication Sig Dispense Refill    vitamin D (ERGOCALCIFEROL) 1.25 MG (51907 UT) CAPS capsule Take 1 capsule by mouth once a week 12 capsule 3    omeprazole (PRILOSEC) 40 MG delayed release capsule Take 40 mg by mouth daily      sertraline (ZOLOFT) 50 MG tablet Take 50 mg by mouth daily       No current facility-administered medications for this visit. No Known Allergies    Social History     Socioeconomic History    Marital status:      Spouse name: None    Number of children: None    Years of education: None    Highest education level: None   Occupational History    Occupation:    Tobacco Use    Smoking status: Former Smoker    Smokeless tobacco: Never Used   Vaping Use    Vaping Use: Never used   Substance and Sexual Activity    Alcohol use: Yes     Comment: rarely    Drug use: No    Sexual activity: None   Other Topics Concern    None   Social History Narrative    None     Social Determinants of Health     Financial Resource Strain:     Difficulty of Paying Living Expenses:    Food Insecurity:     Worried About Running Out of Food in the Last Year:     [address] in the Last Year:    Transportation Needs:     Lack of Transportation (Medical):      Lack of Transportation (Non-Medical):    Physical Activity:     Days of Exercise per Week:     Minutes of Exercise per Session:    Stress:     Feeling of Stress :    Social Connections:     Frequency of Communication with Friends and Family:     Frequency of Social Gatherings with Friends and Family:     Attends Christian Services:     Active Member of Clubs or Organizations:     Attends Club or Organization Meetings:     Marital Status:    Intimate Partner Violence:     Fear of Current or Ex-Partner:     Emotionally Abused:     Physically Abused:     Sexually Abused:        Family History   Problem Relation Age of Onset    Depression Mother         MDD with Suicide    High Blood Pressure Father     Heart Disease Father         a-fib    Other Father         DVT    High Blood Pressure Brother     Colon Cancer Maternal Grandfather        REVIEW OF SYSTEMS:  Review of Systems   Constitutional: Negative for chills and fever. HENT: Negative for congestion and sore throat. Eyes: Negative for pain and visual disturbance. Respiratory: Negative for cough and wheezing. Cardiovascular: Negative for chest pain and palpitations. Gastrointestinal: Negative for nausea and vomiting. Endocrine: Negative for polyphagia and polyuria. Genitourinary: Positive for frequency. Negative for decreased urine volume, difficulty urinating, dyspareunia, dysuria, enuresis, flank pain, genital sores, hematuria, menstrual problem, pelvic pain, urgency, vaginal bleeding, vaginal discharge and vaginal pain. Musculoskeletal: Negative for back pain and neck pain. Skin: Negative for rash and wound. Allergic/Immunologic: Negative for environmental allergies and food allergies. Neurological: Negative for dizziness and headaches. Hematological: Negative for adenopathy. Does not bruise/bleed easily. Psychiatric/Behavioral: Negative for confusion and hallucinations. All other systems reviewed and are negative. PHYSICAL EXAM:  LMP 05/20/2017   Breastfeeding No   Physical Exam  Vitals and nursing note reviewed. Exam conducted with a chaperone present. Constitutional:       General: She is not in acute distress. Appearance: Normal appearance. She is well-developed. HENT:      Head: Normocephalic and atraumatic. Nose: Nose normal.   Eyes:      General: No scleral icterus. Conjunctiva/sclera: Conjunctivae normal.   Cardiovascular:      Rate and Rhythm: Normal rate and regular rhythm. Pulmonary:      Effort: Pulmonary effort is normal. No respiratory distress. Abdominal:      General: Bowel sounds are normal. There is no distension. Palpations: Abdomen is soft. There is no mass. Tenderness: There is no abdominal tenderness. Hernia: There is no hernia in the left inguinal area or right inguinal area. Genitourinary:     General: Normal vulva. Exam position: Lithotomy position. Labia:         Right: No tenderness or lesion. Left: No tenderness or lesion. Urethra: Urethral pain (She has some mild point tenderness to palpation of the mid urethra and anterior vaginal wall but no palpable mass and no demonstrated discharge with palpation) present. No prolapse, urethral swelling or urethral lesion. Vagina: Normal.      Rectum: Normal.      Comments: Adnexa, cervix, uterus surgically abscent, she did have some mucosal dryness typical postmenopausal mucosal changes  Musculoskeletal:         General: No swelling or deformity. Cervical back: Neck supple. Skin:     General: Skin is warm and dry. Neurological:      General: No focal deficit present. Mental Status: She is alert and oriented to person, place, and time.    Psychiatric:         Mood and Affect: Mood normal.         Behavior: Behavior normal.             DATA:  CMP:    Lab Results   Component Value Date     03/25/2021    K 4.2 03/25/2021     03/25/2021    CO2 28 03/25/2021    BUN 15 03/25/2021    CREATININE 0.6 03/25/2021    GFRAA >59 03/25/2021    LABGLOM >60 03/25/2021    GLUCOSE 93 03/25/2021    PROT 7.2 03/25/2021    LABALBU 4.4 03/25/2021    CALCIUM 9.4 03/25/2021    BILITOT <0.2 03/25/2021    ALKPHOS 95 03/25/2021    AST 20 03/25/2021    ALT 20 03/25/2021     Results for orders placed or performed in visit on 06/07/21   POCT Urinalysis No Micro (Auto)   Result Value Ref Range    Color, UA yellow     Clarity, UA clear     Glucose, UA POC neg     Bilirubin, UA neg     Ketones, UA neg     Spec Grav, UA 1.015     Blood, UA POC neg     pH, UA 7.0     Protein, UA POC neg     Urobilinogen, UA 0.2     Leukocytes, UA neg     Nitrite, UA neg          IMAGING:  She had a pelvic ultrasound done here on 4/7/2021 which is essentially unremarkable. MRI done at [facility] report below. MRI pelvis with and without contrast    Impression    Impression:     1.  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   2.  Small T2 intermediate signal lesion near the left ischial   tuberosity may reflect a focal varix of a small tributary of the left   internal iliac vein, less likely peripheral nerve sheath tumor or   myxoma. Further evaluation with Doppler ultrasound is recommended. Electronically signed by Carol Silverman on 5/30/2019 11:58 AM     I, Dread Jasmine MD, have reviewed the images and verify the above   interpretation on 5/30/2019 1:24 PM.     Electronically signed by Dread Jasmine MD on 5/30/2019 1:24 PM  Narrative    Examination: MRI of the pelvis with and without contrast     Comparison: 5/14/2019. Indication: Additional Information :->Pelvic MRI with urethral   diverticulum - for follow up of abnormal MR; K50.018; Crohn's disease   of small intestine with other complication     Technique: Multiplanar multisequence MR imaging was obtained after   administration of 5.4 mL of Gadavist (given during MR abdomen   examination). MR pelvis was scanned after MR abdomen examination. No   second dose was administered. .     Findings:     Redemonstration cluster of periurethral cystic lesions, the largest   measuring 1.1 x 0.7 cm along the left posterolateral aspect of the   urethra, as well as an adjacent 6 mm curvilinear cystic lesion   posteriorly containing layering debris and causing mild mass effect   on the anterior left wall of the vagina. No suspicious enhancing   component. An additional cystic lesion is suggested along the right   lateral wall measuring 4 mm. No communication with the urethra. Status post hysterectomy. 1 cm cyst or follicle on the left ovary. Urinary bladder is unremarkable. No free fluid or pelvic   lymphadenopathy. Enhancing T2 intermediate signal lesion measuring 1.4 cm posterior to   the left ischium may be continuous with a small tributary of the left   internal iliac vein, but indeterminate. Other Result Text    Interface, Radiology Results In - 05/30/2019  1:26 PM CDT   Formatting of this note might be different from the original.   Examination: MRI of the pelvis with and without contrast     Comparison: 5/14/2019. Indication: Additional Information :->Pelvic MRI with urethral   diverticulum - for follow up of abnormal MR; K50.018; Crohn's disease   of small intestine with other complication     Technique: Multiplanar multisequence MR imaging was obtained after   administration of 5.4 mL of Gadavist (given during MR abdomen   examination). MR pelvis was scanned after MR abdomen examination. No   second dose was administered. .     Findings:     Redemonstration cluster of periurethral cystic lesions, the largest   measuring 1.1 x 0.7 cm along the left posterolateral aspect of the   urethra, as well as an adjacent 6 mm curvilinear cystic lesion   posteriorly containing layering debris and causing mild mass effect   on the anterior left wall of the vagina. No suspicious enhancing   component. An additional cystic lesion is suggested along the right   lateral wall measuring 4 mm. No communication with the urethra. Status post hysterectomy. 1 cm cyst or follicle on the left ovary. Urinary bladder is unremarkable. No free fluid or pelvic   lymphadenopathy. Enhancing T2 intermediate signal lesion measuring 1.4 cm posterior to   the left ischium may be continuous with a small tributary of the left   internal iliac vein, but indeterminate. IMPRESSION:   Impression:     1.  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   2.  Small T2 intermediate signal lesion near the left ischial   tuberosity may reflect a focal varix of a small tributary of the left   internal iliac vein, less likely peripheral nerve sheath tumor or   myxoma. Further evaluation with Doppler ultrasound is recommended. Electronically signed by Kim Gibson on 5/30/2019 11:58 AM     I, Shanelle Patel MD, have reviewed the images and verify the above   interpretation on 5/30/2019 1:24 PM.     Electronically signed by Shanelle Patel MD on 5/30/2019 1:24 PM     Date: 05/30/19   Received From: [facility]     Encounter Summary         1. Urethral diverticulum  She is essentially asymptomatic or minimally symptomatic. Therefore I felt like this could be followed. I did offer her a second opinion by a female urologist she is agreeable to pursue this. I think no further diagnostic work-up is needed at this time unless surgery is contemplated I would defer this to the surgical specialist at [facility] if they would want a VCUG or a follow-up MRI, or diagnostic cystoscopy  - POCT Urinalysis No Micro (Auto)      Orders Placed This Encounter   Procedures    POCT Urinalysis No Micro (Auto)        Return in about 2 months (around 8/7/2021) for [facility] f/u. All information inputted into the note by the MA to include chief complaint, past medical history, past surgical history, medications, allergies, social and family history and review of systems has been reviewed and updated as needed by me.     EMR Dragon/transcription disclaimer: Much of this documentt is electronic  transcription/translation of spoken language to printed text. The  electronic translation of spoken language may be erroneous, or at times,  nonsensical words or phrases may be inadvertently transcribed.  Although I  have reviewed the document for such errors, some may still exist.

## 2021-06-07 ENCOUNTER — TELEPHONE (OUTPATIENT)
Dept: UROLOGY | Age: 51
End: 2021-06-07

## 2021-06-07 ENCOUNTER — OFFICE VISIT (OUTPATIENT)
Dept: UROLOGY | Age: 51
End: 2021-06-07
Payer: OTHER GOVERNMENT

## 2021-06-07 DIAGNOSIS — N36.1 URETHRAL DIVERTICULUM: Primary | ICD-10-CM

## 2021-06-07 LAB
BILIRUBIN, POC: NORMAL
BLOOD URINE, POC: NORMAL
CLARITY, POC: CLEAR
COLOR, POC: YELLOW
GLUCOSE URINE, POC: NORMAL
KETONES, POC: NORMAL
LEUKOCYTE EST, POC: NORMAL
NITRITE, POC: NORMAL
PH, POC: 7
PROTEIN, POC: NORMAL
SPECIFIC GRAVITY, POC: 1.01
UROBILINOGEN, POC: 0.2

## 2021-06-07 PROCEDURE — 81003 URINALYSIS AUTO W/O SCOPE: CPT | Performed by: UROLOGY

## 2021-06-07 PROCEDURE — 51798 US URINE CAPACITY MEASURE: CPT | Performed by: UROLOGY

## 2021-06-07 PROCEDURE — 99244 OFF/OP CNSLTJ NEW/EST MOD 40: CPT | Performed by: UROLOGY

## 2021-06-07 NOTE — TELEPHONE ENCOUNTER
CALLED TO LET PT KNOW ABOUT REFERRAL APPT. PT WAS REFERRED TO [facility]. HER APPT IS AUGUST 11TH AT 1PM @ [address]

## 2021-09-02 ENCOUNTER — PATIENT MESSAGE (OUTPATIENT)
Dept: FAMILY MEDICINE CLINIC | Age: 51
End: 2021-09-02

## 2021-09-02 NOTE — TELEPHONE ENCOUNTER
https://[employer]/wp-content/uploads/2020/11/KUGGL-Sqxrmgmi-K-MASKplus-Protocol-ENGLISH.pdf     https://[employer]/wp-content/uploads/2020/11/[facility]-Ivermectin-in-the-prophylaxis-and-treatment-of-COVID-19. pdf     https://[employer]/[email or web address]/uploads/2021/08/SUMMARY-OF-THE-EVIDENCE-BASE.pdf     [email or web address]. pdf  https://[employer]/wp-content/uploads/2020/12/[facility]-Protocols-%E2%80%93-A-Guide-to-the-Management-of-COVID-19. pdf

## 2021-09-02 NOTE — TELEPHONE ENCOUNTER
We are closely monitoring the evolving research and data on the vaccine and at this time feel more comfortable treating post exposure with the DR LEANNA DAMON [facility] protocol than preventing with the vaccine. Id love to hear your thoughts.  If you prefer to discuss via phone, virtual appointment or in person, I can call to set it up!      Thank you for partnering with us in our healthcare decisions.     Hope you are doing well and look forward to talking with you.     - Roosevelt Giron & Olive Spaulding

## 2021-09-02 NOTE — TELEPHONE ENCOUNTER
From: Dorene Smart  To: Smooth Storey MD  Sent: 9/2/2021 3:05 PM CDT  Subject: Prescription Question    Since Im limited to 750 characters I will be sending you two separate messages that go together. Here we go:  Good Afternoon,   I've been keeping up with the [address] [place] [address] AT [facility]) protocols & would like to create a \"Covid prep kit\" based on their recommendations for treatments that can be started at home. I have gathered all the things except the prescription items, particularly the Ivermectin, and was wondering how you felt about prescribing these for us to have on hand, if needed, since it is a time sensitive dosing. I was looking at getting enough for myself and my  Cee Sellers.      I'll include the links in another message for you to take a look at the info I am looking at:

## 2021-09-09 DIAGNOSIS — Z12.31 BREAST CANCER SCREENING BY MAMMOGRAM: Primary | ICD-10-CM

## 2021-09-10 ENCOUNTER — HOSPITAL ENCOUNTER (OUTPATIENT)
Dept: WOMENS IMAGING | Age: 51
Discharge: HOME | End: 2021-09-10
Payer: OTHER GOVERNMENT

## 2021-09-10 DIAGNOSIS — Z12.31 BREAST CANCER SCREENING BY MAMMOGRAM: ICD-10-CM

## 2021-09-10 PROCEDURE — 77067 SCR MAMMO BI INCL CAD: CPT

## 2021-09-13 ENCOUNTER — VIRTUAL VISIT (OUTPATIENT)
Dept: FAMILY MEDICINE CLINIC | Age: 51
End: 2021-09-13
Payer: OTHER GOVERNMENT

## 2021-09-13 DIAGNOSIS — Z28.21 COVID-19 VACCINE DOSE DECLINED: ICD-10-CM

## 2021-09-13 DIAGNOSIS — E55.9 VITAMIN D DEFICIENCY: ICD-10-CM

## 2021-09-13 DIAGNOSIS — Z71.85 VACCINE COUNSELING: Primary | ICD-10-CM

## 2021-09-13 PROCEDURE — 99214 OFFICE O/P EST MOD 30 MIN: CPT | Performed by: INTERNAL MEDICINE

## 2021-09-13 ASSESSMENT — ENCOUNTER SYMPTOMS
RHINORRHEA: 0
SINUS PRESSURE: 0
SORE THROAT: 0
VOICE CHANGE: 0
ABDOMINAL PAIN: 0
COLOR CHANGE: 0
WHEEZING: 0
EYE DISCHARGE: 0
EYE REDNESS: 0
COUGH: 0
CHEST TIGHTNESS: 0
VOMITING: 0
SHORTNESS OF BREATH: 0
DIARRHEA: 0
EYE PAIN: 0
BLOOD IN STOOL: 0

## 2021-09-13 NOTE — PROGRESS NOTES
2021    Chief Complaint   Patient presents with    Other     discuss vaccines/cancer screening        TELEHEALTH EVALUATION -- Audio/Visual (During IIFCN-40 public health emergency)    HPI:    Inge Smith (:  1970) has requested an audio/video evaluation for the following concern(s):  1. Location of patient - Home  2. Location of physician - Office  3. Other individuals on this call - yes -     46 y.o. female being seen for Other (discuss vaccines/cancer screening)     Patient scheduled appointment to request prescription for a \"Covid Prep Kit\" which she has read about on the DR LEANNA DAMON [facility] website that she has researched for alternative treatments for Covid-19 including the use of ivermectin, vitamin D, zinc, vitamin C, and melatonin as referenced by medical professionals on the [facility] website. Patient is retried [employer] and she has chosen not to get the covid-19 vaccine so far because she does not trust the safety of the vaccine and she has been researching alternative ways to help prevent her from getting as well as to treat her for covid-19 to lessen risk of severe disease if she contracts the virus. Patient normally gets her routine vaccines including her yearly influenza vaccines without any history of side effects or allergic reactions to vaccines in the past.      Review of Systems   Constitutional: Negative for appetite change, chills, fatigue and fever. HENT: Negative for ear pain, rhinorrhea, sinus pressure, sore throat and voice change. Eyes: Negative for pain, discharge and redness. Respiratory: Negative for cough, chest tightness, shortness of breath and wheezing. Cardiovascular: Negative for chest pain and palpitations. Gastrointestinal: Negative for abdominal pain, blood in stool, diarrhea and vomiting. Endocrine: Negative for cold intolerance, heat intolerance and polydipsia. Genitourinary: Negative for dysuria and hematuria.    Musculoskeletal: Negative for arthralgias, myalgias, neck pain and neck stiffness. Skin: Negative for color change and rash. Neurological: Negative for dizziness, tremors, syncope, speech difficulty, weakness, numbness and headaches. Hematological: Negative for adenopathy. Does not bruise/bleed easily. Psychiatric/Behavioral: Negative for confusion, dysphoric mood and sleep disturbance. The patient is not nervous/anxious. All other systems reviewed and are negative. Prior to Visit Medications    Medication Sig Taking?  Authorizing Provider   omeprazole (PRILOSEC) 40 MG delayed release capsule Take 40 mg by mouth daily  Historical Provider, MD   sertraline (ZOLOFT) 50 MG tablet Take 50 mg by mouth daily  Historical Provider, MD       Social History     Tobacco Use    Smoking status: Former Smoker    Smokeless tobacco: Never Used   Vaping Use    Vaping Use: Never used   Substance Use Topics    Alcohol use: Yes     Comment: rarely    Drug use: No        Past Medical History:   Diagnosis Date    Acute gastritis     Allergic rhinitis     Basal cell carcinoma     Chondromalacia of knee     Degenerative disc disease, lumbar 9/18/2017    Depression with anxiety     Dermoid cyst of leg     GERD (gastroesophageal reflux disease)     IBS (irritable bowel syndrome)     Lower back pain     Lumbar radiculopathy     Muscle spasm of back     [facility]al [facility]18/2017    Thoracic outlet syndrome     Uterine leiomyoma         Past Surgical History:   Procedure Laterality Date    ARTHROPLASTY      BREAST ENHANCEMENT SURGERY Bilateral 1993    saline, pre-pectoral    COSMETIC SURGERY      HYSTERECTOMY, TOTAL ABDOMINAL  2005    uterine fibroids    MOHS SURGERY      upper right extremity        No Known Allergies     PHYSICAL EXAMINATION:  [ INSTRUCTIONS:  \"[x]\" Indicates a positive item  \"[]\" Indicates a negative item  -- DELETE ALL ITEMS NOT EXAMINED]  Vital Signs: (As obtained by patient/caregiver or practitioner observation)    Patient-Reported Vitals 9/11/2021   Patient-Reported Weight 129   Patient-Reported Height 5'3\"         Constitutional: [x] Appears well-developed and well-nourished [x] No apparent distress      [] Abnormal-   Mental status  [x] Alert and awake  [x] Oriented to person/place/time [x]Able to follow commands      Eyes:  EOM    [x]  Normal  [] Abnormal-  Sclera  [x]  Normal  [] Abnormal -         Discharge [x]  None visible  [] Abnormal -    HENT:   [x] Normocephalic, atraumatic. [] Abnormal   [x] Mouth/Throat: Mucous membranes are moist.     External Ears [x] Normal  [] Abnormal-     Neck: [x] No visualized mass     Pulmonary/Chest: [x] Respiratory effort normal.  [x] No visualized signs of difficulty breathing or respiratory distress        [] Abnormal-      Musculoskeletal:   [x] No joint or extremity swelling         [x] Normal range of motion of neck        [] Abnormal-       Neurological:        [x] No Facial Asymmetry (Cranial nerve 7 motor function) (limited exam to video visit)          [x] No gaze palsy, speech clear, no facial droop, MAEW       [] Abnormal-         Skin:        [x] No significant exanthematous lesions or discoloration noted on facial skin         [] Abnormal-            Psychiatric:       [x] Normal mood and Affect [x] No Hallucinations        [] Abnormal-     Other pertinent observable physical exam findings-     Due to this being a TeleHealth encounter, evaluation of the following organ systems is limited: Vitals/Constitutional/EENT/Resp/CV/GI//MS/Neuro/Skin/Heme-Lymph-Imm. ASSESSMENT/PLAN:  Gomez Dorantes was seen today for other.     Diagnoses and all orders for this visit:    Vaccine counseling    COVID-19 vaccine dose declined    Vitamin D deficiency    -reviewed information about use of ivermectin for potential treatment of patients with Covid-19 viral infection in outpatient setting as requested by patient today including DR [facility] website but then additional information reviewed from the AMA, CDC, and FDA for any new information including clinical trials showing evidence of benefits in treatment of covid-19  -explained to patient that risk of side effects and toxicity from prescribing ivermectin, which is not an FDA approved treatment of Covid-19, is higher than evidence based medicine shows of benefit of prescribing ivermectin so that this physician cannot ethically prescribe as requested by patient today  -did advise patient that she should take vitamin D supplement in treatment of her vitamin D deficiency and that she could take the recommended daily allowance of zinc and vitamin C which although may not help in treatment, are less likely to cause harm when taking but also that this physician could not find evidence of clinical benefit of use of melatonin in treatment of covid-19 at this time  -strongly recommended vaccination with covid-19 vaccine which is the best protection that can be offered at this time to decrease risk of severe covid-19 infection and death from Covid-19 from current clinical evidence.   -patent voiced understanding of discussion today  Return if symptoms worsen or fail to improve. Over 50% of the total visit time of 30 minutes was spent on counseling and/or coordination of care of:   1. Vaccine counseling    2. COVID-19 vaccine dose declined    3. Vitamin D deficiency          An  electronic signature was used to authenticate this note. --Evelin Berkowitz MD on 9/26/2021 at 11:12 PM        Pursuant to the emergency declaration under the [facility], [facility] waiver authority and the [employer] Act, this Virtual  Visit was conducted, with patient's consent, to reduce the patient's risk of exposure to COVID-19 and provide continuity of care for an established patient.     Services were provided through a video synchronous discussion virtually to

## 2021-09-13 NOTE — PATIENT INSTRUCTIONS
Patient Education        COVID-19 (coronavirus 2019) vaccine, Pfizer  Pronunciation:  KOE vid-19 stanislav BELTRE na vye addi VAX een  Brand:  Pfizer-BioNTech COVID-19 Vaccine PF  What is the most important information I should know about this vaccine? The FDA has authorized emergency use of this vaccine as it may help prevent infection with COVID-19. This vaccine has not been approved to prevent or treat coronavirus or COVID-19. Becoming infected with COVID-19 is much more dangerous to your health than receiving this vaccine. If you have a certain type of reaction after the first dose of this vaccine, your healthcare provider will determine if you can safely receive the second dose. What is the COVID-19 vaccine? COVID-19 is a serious disease caused by a coronavirus called SARS-CoV-2 (Serious Acute Respiratory Syndrome Coronavirus 2). COVID-19 is spread from person to person through the air. COVID-19 can affect your lungs or other organs. Symptoms may be mild or serious and include fever, chills, cough, sore throat, shortness of breath, tiredness, body aches, headache, loss of taste or smell, runny or stuffy nose, nausea, vomiting, or diarrhea. The [employer] and Drug Administration (FDA) has authorized emergency use of COVID-19 vaccine to help prevent infection with SARS-CoV-2. The Pfizer vaccine is for use in people who are at least 12years old. This vaccine may help your body develop immunity to SARS-CoV-2. However, this vaccine has not been approved to prevent or treat coronavirus or COVID-19. COVID-19 vaccine is experimental and all of its risks are not yet known. The COVID-19 vaccine does not contain coronavirus and cannot give you COVID-19. Like any vaccine, COVID-19 vaccine may not provide protection in every person. What should I discuss with my healthcare provider before receiving this vaccine? You should not receive this vaccine if you've ever had an allergic reaction to a COVID-19 vaccine.  Your arm.  You will receive a reminder card showing the date and type of your first injection. Take this card with you when you get your second shot. You will be \"fully vaccinated\" if it has been at least 2 weeks since you received your second dose of this vaccine. You may become infected with COVID-19 if the vaccine has not had enough time to provide protection. Receiving this vaccine will not make you less contagious to other people if you are already infected with COVID-19 but you have no symptoms. Keep using infection control methods such as self-isolation, social distancing, hand-washing, using protective face covering, disinfecting surfaces you touch a lot, and not sharing personal items with others. Receiving a COVID-19 vaccine will not cause you to test positive on a coronavirus test. However, once your body develops immunity to COVID-19, you could test positive on an antibody test (a test to detect immunity in your body from previous exposure to coronavirus). It is not known how long this vaccine will protect you from infection with COVID-19. It also is not known how long immunity will last in a person who's been infected with and recovered from COVID-19. COVID-19 vaccine is still being studied and all of its risks are not yet known. Updated federal public health recommendations may be found at [employer]br  What happens if I miss a dose? Be sure to receive all recommended doses of COVID-19 vaccine or you may not be fully protected. Contact your vaccination provider or health department if you miss your second dose. What happens if I overdose? An overdose of this vaccine is unlikely to occur. What should I avoid after receiving this vaccine? Avoid receiving other vaccines without first seeking medical advice. What are the possible side effects of this vaccine?   Get emergency medical help if you have signs of an allergic reaction: receiving a COVID-19 vaccine: www.cdc.gov/vsafe. What other drugs will affect this vaccine? Before receiving this vaccine, tell your vaccination provider about all other vaccines you have received in the past 14 days. Also tell the provider about all your current medicines. This includes prescription and over-the-counter medicines, vitamins, and herbal products. Not all possible interactions are listed here. Where can I get more information? Your vaccination provider, pharmacist, or doctor can provide more information about this vaccine. Additional information is available from your local health department or the Centers for Disease Control and Prevention. Remember, keep this and all other medicines out of the reach of children, never share your medicines with others, and use this medication only for the indication prescribed. Every effort has been made to ensure that the information provided by [address] is accurate, up-to-date, and complete, but no guarantee is made to that effect. Drug information contained herein may be time sensitive. [facility] information has been compiled for use by healthcare practitioners and consumers in the [place] and therefore [facility] does not warrant that uses outside of the [place] are appropriate, unless specifically indicated otherwise. [facility]'s drug information does not endorse drugs, diagnose patients or recommend therapy. [facility] drug information is an informational resource designed to assist licensed healthcare practitioners in caring for their patients and/or to serve consumers viewing this service as a supplement to, and not a substitute for, the expertise, skill, knowledge and judgment of healthcare practitioners. The absence of a warning for a given drug or drug combination in no way should be construed to indicate that the drug or drug combination is safe, effective or appropriate for any given patient.  [employer] does not assume any responsibility for any aspect of healthcare administered with the aid of information [facility] provides. The information contained herein is not intended to cover all possible uses, directions, precautions, warnings, drug interactions, allergic reactions, or adverse effects. If you have questions about the drugs you are taking, check with your doctor, nurse or pharmacist.  Copyright 8492-0169 [address]: 2.01. Revision date: 3/8/2021. Care instructions adapted under license by [facility] ([facility]). If you have questions about a medical condition or this instruction, always ask your healthcare professional. [place] [address] any warranty or liability for your use of this information.

## 2022-01-04 NOTE — PROGRESS NOTES
Get the blood test a few days before the next visit and we can discuss the results at the next visit Shanita Onofre is a 44 y.o. female who presents today for   Chief Complaint   Patient presents with    Annual Exam       HPI  45 y/o WF here for annual wellness and patient needs a pap smear although she is post-hysterectomy from 2005 for uterine fibroids and breast exam. She had lab work last month for hot flashes, night sweats, and vaginal dryness which were consistent with menopause. CXR, carotid dopplers, and EKG all normal also. Patient is not interested in HRT currently. [facility] and [facility] D deficiency. Review of Systems   Constitutional: Negative for appetite change, chills, fatigue and fever. HENT: Negative for ear pain, rhinorrhea, sinus pressure, sore throat and voice change. Eyes: Negative for pain, discharge and redness. Respiratory: Negative for cough, chest tightness, shortness of breath and wheezing. Cardiovascular: Negative for chest pain and palpitations. Gastrointestinal: Negative for abdominal pain, blood in stool, diarrhea and vomiting. Endocrine: Positive for heat intolerance. Negative for cold intolerance and polydipsia. See HPI   Genitourinary: Negative for dysuria and hematuria. Musculoskeletal: Negative for arthralgias, myalgias, neck pain and neck stiffness. Skin: Negative for color change and rash. Neurological: Negative for dizziness, tremors, syncope, speech difficulty, weakness, numbness and headaches. Hematological: Negative for adenopathy. Does not bruise/bleed easily. Psychiatric/Behavioral: Negative for confusion, dysphoric mood and sleep disturbance. The patient is not nervous/anxious. All other systems reviewed and are negative.       Past Medical History:   Diagnosis Date    Acute gastritis     Allergic rhinitis     Basal cell carcinoma     Chondromalacia of knee     Degenerative disc disease, lumbar 9/18/2017    Depression with anxiety     Dermoid cyst of leg     GERD (gastroesophageal reflux disease)     IBS (irritable bowel syndrome)     Lower back pain     Lumbar radiculopathy     Muscle spasm of back     Seasonal affective disorder in remission ([facility] [address] 9/18/2017    Thoracic outlet syndrome     Uterine leiomyoma        Current Outpatient Medications   Medication Sig Dispense Refill    omeprazole (PRILOSEC) 40 MG delayed release capsule Take 40 mg by mouth daily      sertraline (ZOLOFT) 50 MG tablet Take 50 mg by mouth daily      PENTASA 500 MG extended release capsule       vitamin D (ERGOCALCIFEROL) 65001 units CAPS capsule Take 1 capsule by mouth once a week (Patient not taking: Reported on 12/19/2019) 12 capsule 3     No current facility-administered medications for this visit. No Known Allergies    Past Surgical History:   Procedure Laterality Date    ARTHROPLASTY      BREAST ENHANCEMENT SURGERY      COSMETIC SURGERY      HYSTERECTOMY, TOTAL ABDOMINAL  2005    uterine fibroids    MOHS SURGERY      upper right extremity       Social History     Tobacco Use    Smoking status: Former Smoker    Smokeless tobacco: Never Used   Substance Use Topics    Alcohol use: Yes     Comment: rarely    Drug use: No       Family History   Problem Relation Age of Onset    Depression Mother         MDD with Suicide    High Blood Pressure Father     Heart Disease Father         a-fib    Other Father         DVT    High Blood Pressure Brother     Colon Cancer Maternal Grandfather        /70   Pulse 68   Temp 97.9 °F (36.6 °C)   Ht 5' 3.5\" (1.613 m)   Wt 132 lb 9.6 oz (60.1 kg)   LMP 05/20/2017   SpO2 98%   BMI 23.12 kg/m²     Physical Exam  Vitals signs and nursing note reviewed. Exam conducted with a chaperone present. Constitutional:       General: She is not in acute distress. Appearance: She is well-developed. She is not ill-appearing. HENT:      Head: Normocephalic and atraumatic.       Right Ear: Tympanic membrane, ear canal and external ear normal.      Left Ear: Tympanic membrane, ear canal and external ear normal.      Nose: Nose normal.      Mouth/Throat:      Pharynx: Uvula midline. No posterior oropharyngeal erythema. Tonsils: Swellin+ on the right. 1+ on the left. Eyes:      General: Lids are normal. No scleral icterus. Conjunctiva/sclera: Conjunctivae normal.      Pupils: Pupils are equal, round, and reactive to light. Neck:      Musculoskeletal: Normal range of motion and neck supple. Thyroid: No thyroid mass or thyromegaly. Vascular: No carotid bruit or JVD. Trachea: Trachea normal.   Cardiovascular:      Rate and Rhythm: Normal rate and regular rhythm. Pulses:           Radial pulses are 2+ on the right side and 2+ on the left side. Posterior tibial pulses are 2+ on the right side and 2+ on the left side. Heart sounds: Normal heart sounds. No murmur. No friction rub. No gallop. Pulmonary:      Effort: Pulmonary effort is normal. No accessory muscle usage. Breath sounds: Normal breath sounds. No decreased breath sounds, wheezing or rhonchi. Chest:      Breasts: Breasts are symmetrical.         Right: Normal. No inverted nipple, mass, nipple discharge, skin change or tenderness. Left: Normal. No inverted nipple, mass, nipple discharge, skin change or tenderness. Comments: Bilateral breast implants  Abdominal:      General: Bowel sounds are normal. There is no distension. Palpations: Abdomen is soft. There is no hepatomegaly, splenomegaly or mass. Tenderness: There is no tenderness. There is no guarding or rebound. Hernia: No hernia is present. There is no hernia in the right inguinal area or left inguinal area. Genitourinary:     General: Normal vulva. Pubic Area: No rash. Labia:         Right: No lesion. Left: No lesion. Uretha: No prolapse or urethral swelling. Vagina: Normal.      Uterus: Absent.        Adnexa: Right adnexa normal and left adnexa normal.      Comments: Cervix clinically absent, pap smear of vaginal cuff obtained  Musculoskeletal: Normal range of motion. Right wrist: Normal.      Left wrist: Normal.      Right ankle: Normal.      Left ankle: Normal.   Lymphadenopathy:      Head:      Right side of head: No submandibular adenopathy. Left side of head: No submandibular adenopathy. Cervical: No cervical adenopathy. Upper Body:      Right upper body: No supraclavicular adenopathy. Left upper body: No supraclavicular adenopathy. Lower Body: No right inguinal adenopathy. No left inguinal adenopathy. Skin:     General: Skin is warm and dry. Capillary Refill: Capillary refill takes less than 2 seconds. Findings: No rash. Nails: There is no clubbing. Neurological:      Mental Status: She is alert and oriented to person, place, and time. Cranial Nerves: Cranial nerves are intact. No cranial nerve deficit. Sensory: Sensation is intact. Motor: No weakness, tremor, atrophy or abnormal muscle tone. Coordination: Coordination normal.      Gait: Gait is intact. Deep Tendon Reflexes: Reflexes are normal and symmetric. Reflex Scores:       Brachioradialis reflexes are 2+ on the right side and 2+ on the left side. Patellar reflexes are 2+ on the right side and 2+ on the left side. Comments: CN II-XII grossly intact, speech clear, MAEW, no focal deficits   Psychiatric:         Attention and Perception: Attention and perception normal.         Mood and Affect: Mood and affect normal.         Speech: Speech normal.         Behavior: Behavior normal.         Thought Content:  Thought content normal.         Cognition and Memory: Cognition and memory normal.         Judgment: Judgment normal.         Lab Results   Component Value Date    WBC 6.8 12/19/2019    HGB 13.3 12/19/2019    HCT 41.5 12/19/2019    MCV 96.1 12/19/2019     12/19/2019    LYMPHOPCT 29.2 12/19/2019 RBC 4.32 12/19/2019    MCH 30.8 12/19/2019    MCHC 32.0 (L) 12/19/2019    RDW 11.9 12/19/2019     Lab Results   Component Value Date     12/19/2019    K 4.2 12/19/2019     12/19/2019    CO2 26 12/19/2019    BUN 13 12/19/2019    CREATININE 0.6 12/19/2019    GLUCOSE 96 12/19/2019    CALCIUM 9.5 12/19/2019     Lab Results   Component Value Date    CHOL 220 09/13/2018    TRIG 58 09/13/2018    HDL 88 09/13/2018    LDLCALC 120 09/13/2018     Lab Results   Component Value Date    TSH 1.370 12/19/2019    T4FREE 1.43 12/19/2019         Assessment:    ICD-10-CM    1. Annual physical exam Z00.00 CBC Auto Differential     Comprehensive Metabolic Panel     Lipid Panel     TSH without Reflex     T4, Free   2. Encounter for well woman exam with routine gynecological exam Z01.419    3. Cervical cancer screening Z12.4 PAP SMEAR       Plan:  Rachele Dorantes was seen today for annual exam.    Diagnoses and all orders for this visit:    Annual physical exam  -     CBC Auto Differential; Future  -     Comprehensive Metabolic Panel; Future  -     Lipid Panel; Future  -     TSH without Reflex; Future  -     T4, Free; Future    Encounter for well woman exam with routine gynecological exam    Cervical cancer screening  -     PAP SMEAR; Future    -previous lab work reviewed with patient from last year with fasting lipids in 2018  -patient declines HRT at this time  -pap smear of vaginal cuff obtained today  -breast exam normal today, screening mammogram benign on 9/2019  -ECPE in 1 yr    Orders Placed This Encounter   Procedures    PAP SMEAR     Patient History:    Patient's last menstrual period was 05/20/2017.   OBGYN Status: Hysterectomy  Past Surgical History:  No date: ARTHROPLASTY  No date: BREAST ENHANCEMENT SURGERY  No date: COSMETIC SURGERY  2005: HYSTERECTOMY, TOTAL ABDOMINAL      Comment:  uterine fibroids  No date: MOHS SURGERY      Comment:  upper right extremity      Social History    Tobacco Use      Smoking status: Former diabetes, heart disease, and high blood pressure. · Get at least 30 minutes of exercise on most days of the week. Walking is a good choice. You also may want to do other activities, such as running, swimming, cycling, or playing tennis or team sports. · Do not smoke. Smoking can make health problems worse. If you need help quitting, talk to your doctor about stop-smoking programs and medicines. These can increase your chances of quitting for good. · Protect your skin from too much sun. When you're outdoors from 10 a.m. to 4 p.m., stay in the shade or cover up with clothing and a hat with a wide brim. Wear sunglasses that block UV rays. Even when it's cloudy, put broad-spectrum sunscreen (SPF 30 or higher) on any exposed skin. · See a dentist one or two times a year for checkups and to have your teeth cleaned. · Wear a seat belt in the car. Follow your doctor's advice about when to have certain tests. These tests can spot problems early. · Cholesterol. Your doctor will tell you how often to have this done based on your age, family history, or other things that can increase your risk for heart attack and stroke. · Blood pressure. Have your blood pressure checked during a routine doctor visit. Your doctor will tell you how often to check your blood pressure based on your age, your blood pressure results, and other factors. · Mammogram. Ask your doctor how often you should have a mammogram, which is an X-ray of your breasts. A mammogram can spot breast cancer before it can be felt and when it is easiest to treat. · Pap test and pelvic exam. Ask your doctor how often you should have a Pap test. You may not need to have a Pap test as often as you used to. · Vision. Have your eyes checked every year or two or as often as your doctor suggests. Some experts recommend that you have yearly exams for glaucoma and other age-related eye problems starting at age 48. · Hearing.  Tell your doctor if you notice any change in your hearing. You can have tests to find out how well you hear. · Diabetes. Ask your doctor whether you should have tests for diabetes. · Colorectal cancer. Your risk for colorectal cancer gets higher as you get older. Some experts say that adults should start regular screening at age 48 and stop at age 76. Others say to start before age 48 or continue after age 76. Talk with your doctor about your risk and when to start and stop screening. · Thyroid disease. Talk to your doctor about whether to have your thyroid checked as part of a regular physical exam. Women have an increased chance of a thyroid problem. · Osteoporosis. You should begin tests for bone density at age 72. If you are younger than 72, ask your doctor whether you have factors that may increase your risk for this disease. You may want to have this test before age 72. · Heart attack and stroke risk. At least every 4 to 6 years, you should have your risk for heart attack and stroke assessed. Your doctor uses factors such as your age, blood pressure, cholesterol, and whether you smoke or have diabetes to show what your risk for a heart attack or stroke is over the next 10 years. When should you call for help? Watch closely for changes in your health, and be sure to contact your doctor if you have any problems or symptoms that concern you. Where can you learn more? Go to https://Right [employer] Interactive.[employer]. org and sign in to your [employer] account. Enter I378 in the [facility] box to learn more about \"Well Visit, Women 50 to 72: Care Instructions. \"     If you do not have an account, please click on the \"Sign Up Now\" link. Current as of: August 21, 2019  Content Version: 12.3  © 4298-5025 Healthwise, Incorporated. Care instructions adapted under license by [facility] ([facility]).  If you have questions about a medical condition or this instruction, always ask your healthcare professional. January Bauer disclaims any warranty or test and an HPV test. Having both tests is called co-testing. If your results are normal, you can wait 5 years to be tested again. Women 72 and older  · If you are age 72 or older, talk with your doctor about what's right for you. Women ages 72 and older may no longer need to be screened for cervical cancer. When to stop having screening tests depends on your medical history, your overall health, and your risk of cervical cell changes or cervical cancer. What happens after the test?  The sample of cells taken during your test will be sent to a lab so that an expert can look at the cells. It usually takes a week or two to get the results back. Pap tests  · A normal result means that the test didn't find any abnormal cells in the sample. · An abnormal result can mean many things. Most of these aren't cancer. The results of your test may be abnormal because:  ? You have an infection of the vagina or cervix, such as a yeast infection. ? You have low estrogen levels after menopause that are causing the cells to change. ? You have cell changes that may be a sign of precancer or cancer. The results are ranked based on how serious the changes might be. HPV tests  · A negative result means that the test didn't find any high-risk HPV in the sample. · A positive HPV test means that at least one type of high-risk HPV was found. It doesn't mean that you have cervical cancer, but it increases your chance of having cell changes that could lead to cancer. Follow-up care is a key part of your treatment and safety. Be sure to make and go to all appointments, and call your doctor if you are having problems. It's also a good idea to know your test results and keep a list of the medicines you take. Where can you learn more? Go to https://imelda.Weroom. org and sign in to your [employer] account. Enter P919 in the [employer] box to learn more about \"Learning About Cervical Cancer Screening. \"     If you verbalized understanding the above instructions. Return in about 1 year (around 1/8/2021) for [address] [place] [address].

## 2022-04-04 DIAGNOSIS — Z00.00 ANNUAL PHYSICAL EXAM: Primary | ICD-10-CM

## 2022-04-05 DIAGNOSIS — Z00.00 ANNUAL PHYSICAL EXAM: ICD-10-CM

## 2022-04-05 LAB
ALBUMIN SERPL-MCNC: 4.9 G/DL (ref 3.5–5.2)
ALP BLD-CCNC: 102 U/L (ref 35–104)
ALT SERPL-CCNC: 19 U/L (ref 5–33)
ANION GAP SERPL CALCULATED.3IONS-SCNC: 14 MMOL/L (ref 7–19)
AST SERPL-CCNC: 19 U/L (ref 5–32)
BASOPHILS ABSOLUTE: 0 K/UL (ref 0–0.2)
BASOPHILS RELATIVE PERCENT: 0.8 % (ref 0–1)
BILIRUB SERPL-MCNC: <0.2 MG/DL (ref 0.2–1.2)
BUN BLDV-MCNC: 14 MG/DL (ref 6–20)
CALCIUM SERPL-MCNC: 9.9 MG/DL (ref 8.6–10)
CHLORIDE BLD-SCNC: 102 MMOL/L (ref 98–111)
CHOLESTEROL, TOTAL: 252 MG/DL (ref 160–199)
CO2: 25 MMOL/L (ref 22–29)
CREAT SERPL-MCNC: 0.6 MG/DL (ref 0.5–0.9)
EOSINOPHILS ABSOLUTE: 0.1 K/UL (ref 0–0.6)
EOSINOPHILS RELATIVE PERCENT: 2 % (ref 0–5)
GFR AFRICAN AMERICAN: >59
GFR NON-AFRICAN AMERICAN: >60
GLUCOSE BLD-MCNC: 94 MG/DL (ref 74–109)
HCT VFR BLD CALC: 41.3 % (ref 37–47)
HDLC SERPL-MCNC: 100 MG/DL (ref 65–121)
HEMOGLOBIN: 13.1 G/DL (ref 12–16)
IMMATURE GRANULOCYTES #: 0 K/UL
LDL CHOLESTEROL CALCULATED: 139 MG/DL
LYMPHOCYTES ABSOLUTE: 2 K/UL (ref 1.1–4.5)
LYMPHOCYTES RELATIVE PERCENT: 40.6 % (ref 20–40)
MCH RBC QN AUTO: 30.9 PG (ref 27–31)
MCHC RBC AUTO-ENTMCNC: 31.7 G/DL (ref 33–37)
MCV RBC AUTO: 97.4 FL (ref 81–99)
MONOCYTES ABSOLUTE: 0.4 K/UL (ref 0–0.9)
MONOCYTES RELATIVE PERCENT: 8.1 % (ref 0–10)
NEUTROPHILS ABSOLUTE: 2.4 K/UL (ref 1.5–7.5)
NEUTROPHILS RELATIVE PERCENT: 48.3 % (ref 50–65)
PDW BLD-RTO: 12.2 % (ref 11.5–14.5)
PLATELET # BLD: 304 K/UL (ref 130–400)
PMV BLD AUTO: 9.9 FL (ref 9.4–12.3)
POTASSIUM SERPL-SCNC: 4.7 MMOL/L (ref 3.5–5)
RBC # BLD: 4.24 M/UL (ref 4.2–5.4)
SODIUM BLD-SCNC: 141 MMOL/L (ref 136–145)
T4 FREE: 1.63 NG/DL (ref 0.93–1.7)
TOTAL PROTEIN: 6.9 G/DL (ref 6.6–8.7)
TRIGL SERPL-MCNC: 63 MG/DL (ref 0–149)
TSH SERPL DL<=0.05 MIU/L-ACNC: 1.5 UIU/ML (ref 0.27–4.2)
VITAMIN D 25-HYDROXY: 52.5 NG/ML
WBC # BLD: 5 K/UL (ref 4.8–10.8)

## 2022-04-08 LAB — ESTROGEN TOTAL: 40 PG/ML

## 2022-04-11 ENCOUNTER — OFFICE VISIT (OUTPATIENT)
Dept: FAMILY MEDICINE CLINIC | Age: 52
End: 2022-04-11
Payer: OTHER GOVERNMENT

## 2022-04-11 VITALS
BODY MASS INDEX: 24.45 KG/M2 | HEIGHT: 63 IN | HEART RATE: 85 BPM | WEIGHT: 138 LBS | SYSTOLIC BLOOD PRESSURE: 120 MMHG | DIASTOLIC BLOOD PRESSURE: 74 MMHG | OXYGEN SATURATION: 99 % | TEMPERATURE: 97 F

## 2022-04-11 DIAGNOSIS — F33.0 MILD EPISODE OF RECURRENT MAJOR DEPRESSIVE DISORDER (HCC): ICD-10-CM

## 2022-04-11 DIAGNOSIS — Z13.31 POSITIVE DEPRESSION SCREENING: ICD-10-CM

## 2022-04-11 DIAGNOSIS — E78.00 PURE HYPERCHOLESTEROLEMIA: ICD-10-CM

## 2022-04-11 DIAGNOSIS — E55.9 AVITAMINOSIS D: ICD-10-CM

## 2022-04-11 DIAGNOSIS — F51.01 PRIMARY INSOMNIA: ICD-10-CM

## 2022-04-11 DIAGNOSIS — M67.472 GANGLION CYST OF LEFT FOOT: ICD-10-CM

## 2022-04-11 DIAGNOSIS — M51.36 DEGENERATIVE DISC DISEASE, LUMBAR: ICD-10-CM

## 2022-04-11 DIAGNOSIS — M62.830 SPASM OF MUSCLE OF LOWER BACK: ICD-10-CM

## 2022-04-11 DIAGNOSIS — N95.1 MENOPAUSAL STATE: ICD-10-CM

## 2022-04-11 DIAGNOSIS — Z00.01 ENCOUNTER FOR WELL ADULT EXAM WITH ABNORMAL FINDINGS: Primary | ICD-10-CM

## 2022-04-11 DIAGNOSIS — F32.0 MILD MAJOR DEPRESSION (HCC): ICD-10-CM

## 2022-04-11 PROBLEM — F41.8 DEPRESSION WITH ANXIETY: Status: RESOLVED | Noted: 2017-09-15 | Resolved: 2022-04-11

## 2022-04-11 PROBLEM — E78.2 MIXED HYPERLIPIDEMIA: Status: ACTIVE | Noted: 2022-04-11

## 2022-04-11 PROBLEM — E78.2 MIXED HYPERLIPIDEMIA: Status: RESOLVED | Noted: 2022-04-11 | Resolved: 2022-04-11

## 2022-04-11 PROCEDURE — 99396 PREV VISIT EST AGE 40-64: CPT | Performed by: INTERNAL MEDICINE

## 2022-04-11 PROCEDURE — 99214 OFFICE O/P EST MOD 30 MIN: CPT | Performed by: INTERNAL MEDICINE

## 2022-04-11 RX ORDER — MIRTAZAPINE 15 MG/1
15 TABLET, FILM COATED ORAL NIGHTLY
COMMUNITY
End: 2022-07-28

## 2022-04-11 RX ORDER — MIRTAZAPINE 7.5 MG/1
3.75 TABLET, FILM COATED ORAL NIGHTLY PRN
Qty: 45 TABLET | Refills: 3 | Status: SHIPPED | OUTPATIENT
Start: 2022-04-11 | End: 2022-07-28 | Stop reason: SINTOL

## 2022-04-11 ASSESSMENT — PATIENT HEALTH QUESTIONNAIRE - PHQ9
6. FEELING BAD ABOUT YOURSELF - OR THAT YOU ARE A FAILURE OR HAVE LET YOURSELF OR YOUR FAMILY DOWN: 0
4. FEELING TIRED OR HAVING LITTLE ENERGY: 3
8. MOVING OR SPEAKING SO SLOWLY THAT OTHER PEOPLE COULD HAVE NOTICED. OR THE OPPOSITE, BEING SO FIGETY OR RESTLESS THAT YOU HAVE BEEN MOVING AROUND A LOT MORE THAN USUAL: 0
SUM OF ALL RESPONSES TO PHQ9 QUESTIONS 1 & 2: 3
SUM OF ALL RESPONSES TO PHQ QUESTIONS 1-9: 13
1. LITTLE INTEREST OR PLEASURE IN DOING THINGS: 3
5. POOR APPETITE OR OVEREATING: 1
3. TROUBLE FALLING OR STAYING ASLEEP: 3
9. THOUGHTS THAT YOU WOULD BE BETTER OFF DEAD, OR OF HURTING YOURSELF: 0
SUM OF ALL RESPONSES TO PHQ QUESTIONS 1-9: 13
10. IF YOU CHECKED OFF ANY PROBLEMS, HOW DIFFICULT HAVE THESE PROBLEMS MADE IT FOR YOU TO DO YOUR WORK, TAKE CARE OF THINGS AT HOME, OR GET ALONG WITH OTHER PEOPLE: 1
7. TROUBLE CONCENTRATING ON THINGS, SUCH AS READING THE NEWSPAPER OR WATCHING TELEVISION: 3
SUM OF ALL RESPONSES TO PHQ QUESTIONS 1-9: 13
SUM OF ALL RESPONSES TO PHQ QUESTIONS 1-9: 13
2. FEELING DOWN, DEPRESSED OR HOPELESS: 0

## 2022-04-11 ASSESSMENT — ENCOUNTER SYMPTOMS
BLOOD IN STOOL: 0
VOICE CHANGE: 0
SORE THROAT: 0
SINUS PRESSURE: 0
ABDOMINAL PAIN: 0
DIARRHEA: 0
COLOR CHANGE: 0
RHINORRHEA: 0
EYE PAIN: 0
VOMITING: 0
BACK PAIN: 1
EYE REDNESS: 0
CHEST TIGHTNESS: 0
COUGH: 0
WHEEZING: 0
EYE DISCHARGE: 0
SHORTNESS OF BREATH: 0

## 2022-04-11 ASSESSMENT — VISUAL ACUITY: OU: 1

## 2022-04-11 NOTE — PATIENT INSTRUCTIONS
Well Visit, Women 48 to 72: Care Instructions  Overview     Well visits can help you stay healthy. Your doctor has checked your overall health and may have suggested ways to take good care of yourself. Your doctor also may have recommended tests. At home, you can help prevent illness withhealthy eating, regular exercise, and other steps. Follow-up care is a key part of your treatment and safety. Be sure to make and go to all appointments, and call your doctor if you are having problems. It's also a good idea to know your test results and keep alist of the medicines you take. How can you care for yourself at home?  Get screening tests that you and your doctor decide on. Screening helps find diseases before any symptoms appear.  Eat healthy foods. Choose fruits, vegetables, whole grains, protein, and low-fat dairy foods. Limit fat, especially saturated fat. Reduce salt in your diet.  Limit alcohol. Have no more than 1 drink a day or 7 drinks a week.  Get at least 30 minutes of exercise on most days of the week. Walking is a good choice. You also may want to do other activities, such as running, swimming, cycling, or playing tennis or team sports.  Reach and stay at a healthy weight. This will lower your risk for many problems, such as obesity, diabetes, heart disease, and high blood pressure.  Do not smoke. Smoking can make health problems worse. If you need help quitting, talk to your doctor about stop-smoking programs and medicines. These can increase your chances of quitting for good.  Care for your mental health. It is easy to get weighed down by worry and stress. Learn strategies to manage stress, like deep breathing and mindfulness, and stay connected with your family and community. If you find you often feel sad or hopeless, talk with your doctor. Treatment can help.  Talk to your doctor about whether you have any risk factors for sexually transmitted infections (STIs).  You can help prevent STIs if you wait to have sex with a new partner (or partners) until you've each been tested for STIs. It also helps if you use condoms (male or female condoms) and if you limit your sex partners to one person who only has sex with you. Vaccines are available for some STIs.  If you think you may have a problem with alcohol or drug use, talk to your doctor. This includes prescription medicines (such as amphetamines and opioids) and illegal drugs (such as cocaine and methamphetamine). Your doctor can help you figure out what type of treatment is best for you.  Protect your skin from too much sun. When you're outdoors from 10 a.m. to 4 p.m., stay in the shade or cover up with clothing and a hat with a wide brim. Wear sunglasses that block UV rays. Even when it's cloudy, put broad-spectrum sunscreen (SPF 30 or higher) on any exposed skin.  See a dentist one or two times a year for checkups and to have your teeth cleaned.  Wear a seat belt in the car. When should you call for help? Watch closely for changes in your health, and be sure to contact your doctor if you have any problems or symptoms that concern you. Where can you learn more? Go to https://GameWithpejoeeweb.health-partners. org and sign in to your [employer] account. Enter V441 in the [employer] box to learn more about \"Well Visit, Women 50 to 72: Care Instructions. \"     If you do not have an account, please click on the \"Sign Up Now\" link. Current as of: October 6, 2021               Content Version: 13.2  © 2006-2022 Healthwise, Incorporated. Care instructions adapted under license by [facility] ([facility]). If you have questions about a medical condition or this instruction, always ask your healthcare professional. [place] [address] any warranty or liability for your use of this information.            A Healthy Lifestyle: Care Instructions  Your Care Instructions     A healthy lifestyle can help you feel good, stay at a healthy weight, and have plenty of energy for both work and play. A healthy lifestyle is something youcan share with your whole family. A healthy lifestyle also can lower your risk for serious health problems, suchas high blood pressure, heart disease, and diabetes. You can follow a few steps listed below to improve your health and the healthof your family. Follow-up care is a key part of your treatment and safety. Be sure to make and go to all appointments, and call your doctor if you are having problems. It's also a good idea to know your test results and keep alist of the medicines you take. How can you care for yourself at home?  Do not eat too much sugar, fat, or fast foods. You can still have dessert and treats now and then. The goal is moderation.  Start small to improve your eating habits. Pay attention to portion sizes, drink less juice and soda pop, and eat more fruits and vegetables. ? Eat a healthy amount of food. A 3-ounce serving of meat, for example, is about the size of a deck of cards. Fill the rest of your plate with vegetables and whole grains. ? Limit the amount of soda and sports drinks you have every day. Drink more water when you are thirsty. ? Eat plenty of fruits and vegetables every day. Have an apple or some carrot sticks as an afternoon snack instead of a candy bar. Try to have fruits and/or vegetables at every meal.   Make exercise part of your daily routine. You may want to start with simple activities, such as walking, bicycling, or slow swimming. Try to be active 30 to 60 minutes every day. You do not need to do all 30 to 60 minutes all at once. For example, you can exercise 3 times a day for 10 or 20 minutes. Moderate exercise is safe for most people, but it is always a good idea to talk to your doctor before starting an exercise program.   Keep moving. [place] the lawn, work in the garden, or [employer]. Take the stairs instead of the elevator at work.    If you smoke, quit. People who smoke have an increased risk for heart attack, stroke, cancer, and other lung illnesses. Quitting is hard, but there are ways to boost your chance of quitting tobacco for good. ? Use nicotine gum, patches, or lozenges. ? Ask your doctor about stop-smoking programs and medicines. ? Keep trying. In addition to reducing your risk of diseases in the future, you will notice some benefits soon after you stop using tobacco. If you have shortness of breath or asthma symptoms, they will likely get better within a few weeks after you quit.  Limit how much alcohol you drink. Moderate amounts of alcohol (up to 2 drinks a day for men, 1 drink a day for women) are okay. But drinking too much can lead to liver problems, high blood pressure, and other health problems. Family health  If you have a family, there are many things you can do together to improve yourhealth.  Eat meals together as a family as often as possible.  Eat healthy foods. This includes fruits, vegetables, lean meats and dairy, and whole grains.  Include your family in your fitness plan. Most people think of activities such as jogging or tennis as the way to fitness, but there are many ways you and your family can be more active. Anything that makes you breathe hard and gets your heart pumping is exercise. Here are some tips:  ? Walk to do errands or to take your child to school or the bus.  ? Go for a family bike ride after dinner instead of watching TV. Where can you learn more? Go to https://[employer].healthSelero. org and sign in to your [employer] account. Enter T214 in the [facility] box to learn more about \"A Healthy Lifestyle: Care Instructions. \"     If you do not have an account, please click on the \"Sign Up Now\" link. Current as of: June 16, 2021               Content Version: 13.2  © 6802-4596 Healthwise, Incorporated. Care instructions adapted under license by [facility] ([facility]).  If you have questions about a medical condition or this instruction, always ask your healthcare professional. [address] any warranty or liability for your use of this information. Heart-Healthy Diet   Sodium, Fat, and Cholesterol Controlled Diet       What Is a Heart Healthy Diet? A heart-healthy diet is one that limits sodium , certain types of fat , and cholesterol . This type of diet is recommended for:   · People with any form of cardiovascular disease (eg, coronary heart disease , peripheral vascular disease , previous heart attack , previous stroke )   · People with risk factors for cardiovascular disease, such as high blood pressure , high cholesterol , or diabetes   · Anyone who wants to lower their risk of developing cardiovascular disease   Sodium    Sodium is a mineral found in many foods. In general, most people consume much more sodium than they need. Diets high in sodium can increase blood pressure and lead to edema (water retention). On a heart-healthy diet, you should consume no more than 2,300 mg (milligrams) of sodium per dayabout the amount in one teaspoon of table salt. The foods highest in sodium include table salt (about 50% sodium), processed foods, convenience foods, and preserved foods. Cholesterol    Cholesterol is a fat-like, waxy substance in your blood. Our bodies make some cholesterol. It is also found in animal products, with the highest amounts in fatty meat, egg yolks, whole milk, cheese, shellfish, and organ meats. On a heart-healthy diet, you should limit your cholesterol intake to less than 200 mg per day. It is normal and important to have some cholesterol in your bloodstream. But too much cholesterol can cause plaque to build up within your arteries, which can eventually lead to a heart attack or stroke.    The two types of cholesterol that are most commonly referred to are:   · Low-density lipoprotein (LDL) cholesterol  Also known as bad cholesterol, this is the cholesterol that tends to build up along your arteries. Bad cholesterol levels are increased by eating fats that are saturated or hydrogenated. Optimal level of this cholesterol is less than 100. Over 130 starts to get risky for heart disease. · High-density lipoprotein (HDL) cholesterol  Also known as good cholesterol, this type of cholesterol actually carries cholesterol away from your arteries and may, therefore, help lower your risk of having a heart attack. You want this level to be high (ideally greater than 60). It is a risk to have a level less than 40. You can raise this good cholesterol by eating olive oil, canola oil, avocados, or nuts. Exercise raises this level, too. Fat    Fat is calorie dense and packs a lot of calories into a small amount of food. Even though fats should be limited due to their high calorie content, not all fats are bad. In fact, some fats are quite healthful. Fat can be broken down into four main types.    · The good-for-you fats are:   ¨ Monounsaturated fat  found in oils such as olive and canola, avocados, and nuts and natural nut butters; can decrease cholesterol levels, while keeping levels of HDL cholesterol high   ¨ Polyunsaturated fat  found in oils such as safflower, sunflower, soybean, corn, and sesame; can decrease total cholesterol and LDL cholesterol   ¨ Omega-3 fatty acids  particularly those found in fatty fish (such as salmon, trout, tuna, mackerel, herring, and sardines); can decrease risk of arrhythmias, decrease triglyceride levels, and slightly lower blood pressure   · The fats that you want to limit are:   ¨ Saturated fat  found in animal products, many fast foods, and a few vegetables; increases total blood cholesterol, including LDL levels   § Animal fats that are saturated include: butter, lard, whole-milk dairy products, meat fat, and poultry skin   § Vegetable fats that are saturated include: hydrogenated shortening, palm oil, coconut oil, cocoa butter ¨ Hydrogenated or trans fat  found in margarine and vegetable shortening, most shelf stable snack foods, and fried foods; increases LDL and decreases HDL     It is generally recommended that you limit your total fat for the day to less than 30% of your total calories. If you follow an 1800-calorie heart healthy diet, for example, this would mean 60 grams of fat or less per day. Saturated fat and trans fat in your diet raises your blood cholesterol the most, much more than dietary cholesterol does. For this reason, on a heart-healthy diet, less than 7% of your calories should come from saturated fat and ideally 0% from trans fat. On an 1800-calorie diet, this translates into less than 14 grams of saturated fat per day, leaving 46 grams of fat to come from mono- and polyunsaturated fats.    Food Choices on a Heart Healthy Diet   Food Category   Foods Recommended   Foods to Avoid   Grains   Breads and rolls without salted tops Most dry and cooked cereals Unsalted crackers and breadsticks Low-sodium or homemade breadcrumbs or stuffing All rice and pastas   Breads, rolls, and crackers with salted tops High-fat baked goods (eg, muffins, donuts, pastries) Quick breads, self-rising flour, and biscuit mixes Regular bread crumbs Instant hot cereals Commercially prepared rice, pasta, or stuffing mixes   Vegetables   Most fresh, frozen, and low-sodium canned vegetables Low-sodium and salt-free vegetable juices Canned vegetables if unsalted or rinsed   Regular canned vegetables and juices, including sauerkraut and pickled vegetables Frozen vegetables with sauces Commercially prepared potato and vegetable mixes   Fruits   Most fresh, frozen, and canned fruits All fruit juices   Fruits processed with salt or sodium   Milk   Nonfat or low-fat (1%) milk Nonfat or low-fat yogurt Cottage cheese, low-fat ricotta, cheeses labeled as low-fat and low-sodium   Whole milk Reduced-fat (2%) milk Malted and chocolate milk Full fat yogurt Most cheeses (unless low-fat and low salt) Buttermilk (no more than 1 cup per week)   Meats and Beans   Lean cuts of fresh or frozen beef, veal, lamb, or pork (look for the word loin) Fresh or frozen poultry without the skin Fresh or frozen fish and some shellfish Egg whites and egg substitutes (Limit whole eggs to three per week) Tofu Nuts or seeds (unsalted, dry-roasted), low-sodium peanut butter Dried peas, beans, and lentils   Any smoked, cured, salted, or canned meat, fish, or poultry (including ary, chipped beef, cold cuts, hot dogs, sausages, sardines, and anchovies) Poultry skins Breaded and/or fried fish or meats Canned peas, beans, and lentils Salted nuts   Fats and Oils   Olive oil and canola oil Low-sodium, low-fat salad dressings and mayonnaise   Butter, margarine, coconut and palm oils, ray fat   Snacks, Sweets, and Condiments   Low-sodium or unsalted versions of broths, soups, soy sauce, and condiments Pepper, herbs, and spices; vinegar, lemon, or lime juice Low-fat frozen desserts (yogurt, sherbet, fruit bars) Sugar, cocoa powder, honey, syrup, jam, and preserves Low-fat, trans-fat free cookies, cakes, and pies Rbad and animal crackers, fig bars, jinny snaps   High-fat desserts Broth, soups, gravies, and sauces, made from instant mixes or other high-sodium ingredients Salted snack foods Canned olives Meat tenderizers, seasoning salt, and most flavored vinegars   Beverages   Low-sodium carbonated beverages Tea and coffee in moderation Soy milk   Commercially softened water   Suggestions   · Make whole grains, fruits, and vegetables the base of your diet. · Choose heart-healthy fats such as canola, olive, and flaxseed oil, and foods high in heart-healthy fats, such as nuts, seeds, soybeans, tofu, and fish. · Eat fish at least twice per week; the fish highest in omega-3 fatty acids and lowest in mercury include salmon, herring, mackerel, sardines, and canned chunk light tuna.  If you eat fish less than twice per week or have high triglycerides, talk to your doctor about taking fish oil supplements. · Read food labels. ¨ For products low in fat and cholesterol, look for fat free, low-fat, cholesterol free, saturated fat free, and trans fat freeAlso scan the Nutrition Facts Label, which lists saturated fat, trans fat, and cholesterol amounts. ¨ For products low in sodium, look for sodium free, very low sodium, low sodium, no added salt, and unsalted   · Skip the salt when cooking or at the table; if food needs more flavor, get creative and try out different herbs and spices. Garlic and onion also add substantial flavor to foods. · Trim any visible fat off meat and poultry before cooking, and drain the fat off after broderick. · Use cooking methods that require little or no added fat, such as grilling, boiling, baking, poaching, broiling, roasting, steaming, stir-frying, and sauting. · Avoid fast food and convenience food. They tend to be high in saturated and trans fat and have a lot of added salt. · Talk to a registered dietitian for individualized diet advice.       Last Reviewed: March 2011 Maria C Valladares MS, MPH, RD   Updated: 3/29/2011

## 2022-04-11 NOTE — PROGRESS NOTES
Well Adult Note  Name: Prabha Jones Date: 2022   MRN: [id number] Sex: Female   Age: 45 y.o. Ethnicity: Non- / Non    : 1970 Race: White (non-)      Adriana Connell is here for well adult exam.  History:  52y/o WF here for annual wellness and review of labs and patient is concerned about cholesterol being higher, low back pain flared up more in past 3 weeks, nodule on bottom of left foot that is painful, and then she has a \"metal taste\" in her mouth. BMI Readings from Last 3 Encounters:   22 24.45 kg/m²   21 23.40 kg/m²   20 23.12 kg/m²     Wt Readings from Last 3 Encounters:   22 138 lb (62.6 kg)   21 132 lb 2 oz (59.9 kg)   20 132 lb 9.6 oz (60.1 kg)     Major depressive disorder and generalized anxiety disorder-[facility] fills 100 mg sertraline which patient takes 1/2 tablet daily for treatment but she has been on this dose for some time now and no longer seeing behavioral health for counseling. She has had more depression symptoms and fatigue recently as well as anhedonia. She has not been exercising whereas she has always been active. She feels like she has to [facility] herself go to [place] where she has always enjoyed going in the past. She was taking mirtazapine for insomnia from provider at [facility] which helped with sleep but then caused daytime fatigue, so she is only taking 1/4 tablet now which seems to be effective and she is tolerating it better. Review of Systems   Constitutional: Positive for fatigue. Negative for appetite change, chills, diaphoresis, fever and unexpected weight change. HENT: Negative for ear pain, rhinorrhea, sinus pressure, sore throat and voice change. Eyes: Negative for pain, discharge and redness. Respiratory: Negative for cough, chest tightness, shortness of breath and wheezing. Cardiovascular: Negative for chest pain and palpitations.    Gastrointestinal: Negative for abdominal pain, blood in stool, saline, pre-pectoral    COSMETIC SURGERY      HYSTERECTOMY, TOTAL ABDOMINAL  2005    uterine fibroids    MOHS SURGERY      upper right extremity         Family History   Problem Relation Age of Onset    Depression Mother         MDD with Suicide    High Blood Pressure Father     Heart Disease Father         a-fib    Other Father         DVT    High Blood Pressure Brother     Colon Cancer Maternal Grandfather        Social History     Tobacco Use    Smoking status: Former Smoker    Smokeless tobacco: Never Used   Vaping Use    Vaping Use: Never used   Substance Use Topics    Alcohol use: Yes     Alcohol/week: 1.0 standard drink     Types: 1 Cans of beer per week     Comment: weekly    Drug use: Never       Objective   /74   Pulse 85   Temp 97 °F (36.1 °C)   Ht 5' 3\" (1.6 m)   Wt 138 lb (62.6 kg)   LMP 05/20/2017   SpO2 99%   BMI 24.45 kg/m²   Wt Readings from Last 3 Encounters:   04/11/22 138 lb (62.6 kg)   03/31/21 132 lb 2 oz (59.9 kg)   01/08/20 132 lb 9.6 oz (60.1 kg)     There were no vitals filed for this visit. Physical Exam  Vitals and nursing note reviewed. Constitutional:       General: She is not in acute distress. Appearance: Normal appearance. She is well-developed, well-groomed and normal weight. She is not ill-appearing, toxic-appearing or diaphoretic. HENT:      Head: Normocephalic and atraumatic. Right Ear: Tympanic membrane, ear canal and external ear normal.      Left Ear: Tympanic membrane, ear canal and external ear normal.      Nose: Nose normal.      Mouth/Throat:      Lips: Pink. Mouth: Mucous membranes are moist. No oral lesions. Tongue: No lesions. Palate: No mass and lesions. Pharynx: Oropharynx is clear. Uvula midline. No pharyngeal swelling, oropharyngeal exudate, posterior oropharyngeal erythema or uvula swelling. Tonsils: 1+ on the right. 1+ on the left. Eyes:      General: Lids are normal. Vision grossly intact.  No scleral icterus. Extraocular Movements: Extraocular movements intact. Conjunctiva/sclera: Conjunctivae normal.      Pupils: Pupils are equal, round, and reactive to light. Neck:      Thyroid: No thyroid mass or thyromegaly. Vascular: No carotid bruit or JVD. Trachea: Trachea and phonation normal.   Cardiovascular:      Rate and Rhythm: Normal rate and regular rhythm. Pulses: Normal pulses. Radial pulses are 2+ on the right side and 2+ on the left side. Dorsalis pedis pulses are 2+ on the right side and 2+ on the left side. Posterior tibial pulses are 2+ on the right side and 2+ on the left side. Heart sounds: Normal heart sounds. No murmur heard. No friction rub. No gallop. Pulmonary:      Effort: Pulmonary effort is normal. No accessory muscle usage. Breath sounds: Normal breath sounds. No decreased breath sounds, wheezing, rhonchi or rales. Chest:      Chest wall: No mass or tenderness. Breasts: Breasts are symmetrical.      Right: Normal. No swelling, bleeding, inverted nipple, mass, nipple discharge, skin change, tenderness, axillary adenopathy or supraclavicular adenopathy. Left: No swelling, bleeding, inverted nipple, mass, nipple discharge, skin change, tenderness, axillary adenopathy or supraclavicular adenopathy. Comments: Bilateral breast implants in place  Abdominal:      General: Abdomen is flat. Bowel sounds are normal. There is no distension. Palpations: Abdomen is soft. There is no hepatomegaly, splenomegaly or mass. Tenderness: There is no abdominal tenderness. There is no guarding or rebound. Hernia: No hernia is present. Musculoskeletal:         General: Normal range of motion. Right wrist: Normal.      Left wrist: Normal.      Cervical back: Normal range of motion and neck supple. Lumbar back: Spasms and tenderness present. No swelling, deformity or bony tenderness.       Right lower leg: No edema. Left lower leg: No edema. Right ankle: Normal.      Left ankle: Normal.        Feet:    Feet:      Right foot:      Skin integrity: Skin integrity normal.      Toenail Condition: Right toenails are normal.      Left foot:      Skin integrity: Skin integrity normal.      Toenail Condition: Left toenails are normal.   Lymphadenopathy:      Head:      Right side of head: No submandibular adenopathy. Left side of head: No submandibular adenopathy. Cervical: No cervical adenopathy. Right cervical: No superficial, deep or posterior cervical adenopathy. Left cervical: No superficial, deep or posterior cervical adenopathy. Upper Body:      Right upper body: No supraclavicular, axillary or pectoral adenopathy. Left upper body: No supraclavicular, axillary or pectoral adenopathy. Lower Body: No right inguinal adenopathy. No left inguinal adenopathy. Skin:     General: Skin is warm and dry. Capillary Refill: Capillary refill takes less than 2 seconds. Coloration: Skin is not cyanotic. Findings: No rash. Nails: There is no clubbing. Neurological:      Mental Status: She is alert and oriented to person, place, and time. Cranial Nerves: No cranial nerve deficit, dysarthria or facial asymmetry. Sensory: Sensation is intact. Motor: Motor function is intact. No weakness, tremor, atrophy or abnormal muscle tone. Coordination: Coordination is intact. Romberg sign negative. Coordination normal.      Gait: Gait is intact. Deep Tendon Reflexes: Reflexes are normal and symmetric. Reflex Scores:       Brachioradialis reflexes are 2+ on the right side and 2+ on the left side. Patellar reflexes are 2+ on the right side and 2+ on the left side.      Comments: CN II-XII grossly intact, speech clear, MAEW, no focal deficits   Psychiatric:         Attention and Perception: Attention and perception normal.         Mood and Affect: Mood is anxious and depressed. Affect is not inappropriate. Speech: Speech normal.         Behavior: Behavior normal. Behavior is cooperative. Thought Content: Thought content normal.         Cognition and Memory: Cognition and memory normal.         Judgment: Judgment normal.      Comments: Affect congruent with mood         Lab Results   Component Value Date    WBC 5.0 04/05/2022    HGB 13.1 04/05/2022    HCT 41.3 04/05/2022    MCV 97.4 04/05/2022     04/05/2022    LYMPHOPCT 40.6 (H) 04/05/2022    RBC 4.24 04/05/2022    MCH 30.9 04/05/2022    MCHC 31.7 (L) 04/05/2022    RDW 12.2 04/05/2022       Lab Results   Component Value Date     04/05/2022    K 4.7 04/05/2022     04/05/2022    CO2 25 04/05/2022    BUN 14 04/05/2022    CREATININE 0.6 04/05/2022    GLUCOSE 94 04/05/2022    CALCIUM 9.9 04/05/2022    PROT 6.9 04/05/2022    LABALBU 4.9 04/05/2022    BILITOT <0.2 04/05/2022    ALKPHOS 102 04/05/2022    AST 19 04/05/2022    ALT 19 04/05/2022    LABGLOM >60 04/05/2022    GFRAA >59 04/05/2022     Lab Results   Component Value Date    CHOL 252 (H) 04/05/2022    CHOL 234 (H) 03/25/2021    CHOL 220 (H) 09/13/2018     Lab Results   Component Value Date    TRIG 63 04/05/2022    TRIG 59 03/25/2021    TRIG 58 09/13/2018     Lab Results   Component Value Date     04/05/2022    HDL 98 03/25/2021    HDL 88 09/13/2018     Lab Results   Component Value Date    LDLCALC 139 04/05/2022    LDLCALC 124 03/25/2021    LDLCALC 120 09/13/2018     No results found for: LABVLDL, VLDL  No results found for: [facility]   Lab Results   Component Value Date    VITD25 52.5 04/05/2022     Lab Results   Component Value Date    TSH 1.500 04/05/2022    T4FREE 1.63 04/05/2022       Assessment   Plan   1. Encounter for well adult exam with abnormal findings  2. Mild episode of recurrent major depressive disorder (HCC)  -     sertraline (ZOLOFT) 50 MG tablet;  Take 1.5 tablets by mouth daily, Disp-135 tablet, R-3Normal  3. Primary insomnia  -     mirtazapine (REMERON) 7.5 MG tablet; Take 0.5 tablets by mouth nightly as needed (sleep), Disp-45 tablet, R-3Normal  4. Pure hypercholesterolemia  -     Comprehensive Metabolic Panel; Future  -     Lipid Panel; Future  5. Avitaminosis D  Comments:  well controlled. continue vitamin D replacement. 6. Ganglion cyst of left foot  Comments:  ok to monitor for now. will need podiatry referral if symptoms worsen. 7. Degenerative disc disease, lumbar  8. Spasm of muscle of lower back  Comments:  stretching exercises recommended with handout provided. follow up if symptoms worsen or fail to improve. 9. Menopausal state  10. [facility] ([facility]  11. Positive depression screening     Labs reviewed with patient. Refills Provided.   -Pure Hyperlipidemia not well controlled. Medication not clinically indicated at this time. Low cholesterol diet, exercise, and weight loss encouraged. -Major Depression not well controlled. Medication adjusted as follows sertraline dose increased to 75 mg daily. Patient declines referral to Psychiatry/Behavioral Health at this time. PHQ-9 score today: (PHQ-9 Total Score: 13), additional evaluation and assessment performed, follow-up plan includes but not limited to: Medication management.  -Primary insomnia-improving with mirtazapine prescribed by [address] which was refilled today at current dose patient has been taking.   -Health Maintenance reviewed - breast exam completed today (advised of need to have breast exam in addition to mammogram), PAP smear up to date, and colonoscopy scheduled for Summer 2022 with gastroenterology. Patient declines covid-19 vaccines. -Return in 3 months (on 7/11/2022) for recheck mood. Over 50% of the total visit time of 45 minutes was spent on counseling and/or coordination of care of:   1. Encounter for well adult exam with abnormal findings    2.  Mild episode of recurrent major depressive disorder ([facility] 3. Primary insomnia    4. Pure hypercholesterolemia    5. Avitaminosis D    6. Ganglion cyst of left foot    7. Degenerative disc disease, lumbar    8. Spasm of muscle of lower back    9. Menopausal state    10. Mild major depression ([address]    11.  Positive depression screening       Personalized Preventive Plan   Current Health Maintenance Status  Immunization History   Administered Date(s) Administered    Influenza, Quadv, IM, (6 mo and older Fluzone, Flulaval, Fluarix and 3 yrs and older Afluria) 11/17/2018    Influenza, Quadv, IM, PF (6 mo and older Fluzone, Flulaval, Fluarix, and 3 yrs and older Afluria) 10/15/2020    Td, unspecified formulation 10/28/2011    Tdap (Boostrix, Adacel) 09/20/2018    Zoster Recombinant (Shingrix) 09/20/2018, 02/04/2019        Health Maintenance   Topic Date Due    COVID-19 Vaccine (1) 04/11/2023 (Originally 3/23/1975)    Depression Monitoring  04/11/2023    Breast cancer screen  09/10/2023    Colorectal Cancer Screen  08/21/2025    Lipid screen  04/05/2027    DTaP/Tdap/Td vaccine (2 - Td or Tdap) 09/20/2028    Flu vaccine  Completed    Shingles Vaccine  Completed    Hepatitis C screen  Completed    HIV screen  Completed    Hepatitis A vaccine  Aged Out    Hepatitis B vaccine  Aged Out    Hib vaccine  Aged Out    Meningococcal (ACWY) vaccine  Aged Out    Pneumococcal 0-64 years Vaccine  Aged Out     Recommendations for [employer] Due: see orders and patient instructions/AVS.

## 2022-06-02 ENCOUNTER — TELEPHONE (OUTPATIENT)
Dept: FAMILY MEDICINE CLINIC | Age: 52
End: 2022-06-02

## 2022-06-02 ENCOUNTER — TELEMEDICINE (OUTPATIENT)
Dept: FAMILY MEDICINE CLINIC | Age: 52
End: 2022-06-02
Payer: OTHER GOVERNMENT

## 2022-06-02 DIAGNOSIS — U07.1 COVID-19 VIRUS INFECTION: Primary | ICD-10-CM

## 2022-06-02 DIAGNOSIS — U07.1 COVID-19: Primary | ICD-10-CM

## 2022-06-02 PROCEDURE — 99441 PR PHYS/QHP TELEPHONE EVALUATION 5-10 MIN: CPT | Performed by: INTERNAL MEDICINE

## 2022-06-02 RX ORDER — ALBUTEROL SULFATE 90 UG/1
4 AEROSOL, METERED RESPIRATORY (INHALATION) PRN
Status: CANCELLED | OUTPATIENT
Start: 2022-06-03

## 2022-06-02 RX ORDER — BEBTELOVIMAB 87.5 MG/ML
175 INJECTION, SOLUTION INTRAVENOUS ONCE
Status: CANCELLED | OUTPATIENT
Start: 2022-06-03 | End: 2022-06-03

## 2022-06-02 RX ORDER — DIPHENHYDRAMINE HYDROCHLORIDE 50 MG/ML
25 INJECTION INTRAMUSCULAR; INTRAVENOUS ONCE
Status: CANCELLED
Start: 2022-06-03 | End: 2022-06-03

## 2022-06-02 RX ORDER — SODIUM CHLORIDE 0.9 % (FLUSH) 0.9 %
5-40 SYRINGE (ML) INJECTION PRN
Status: CANCELLED | OUTPATIENT
Start: 2022-06-03

## 2022-06-02 RX ORDER — HEPARIN SODIUM (PORCINE) LOCK FLUSH IV SOLN 100 UNIT/ML 100 UNIT/ML
500 SOLUTION INTRAVENOUS PRN
Status: CANCELLED | OUTPATIENT
Start: 2022-06-03

## 2022-06-02 RX ORDER — DIPHENHYDRAMINE HYDROCHLORIDE 50 MG/ML
50 INJECTION INTRAMUSCULAR; INTRAVENOUS
Status: CANCELLED | OUTPATIENT
Start: 2022-06-03

## 2022-06-02 RX ORDER — EPINEPHRINE 1 MG/ML
0.3 INJECTION, SOLUTION, CONCENTRATE INTRAVENOUS PRN
Status: CANCELLED | OUTPATIENT
Start: 2022-06-03

## 2022-06-02 RX ORDER — SODIUM CHLORIDE 9 MG/ML
INJECTION, SOLUTION INTRAVENOUS CONTINUOUS
Status: CANCELLED | OUTPATIENT
Start: 2022-06-03

## 2022-06-02 RX ORDER — ACETAMINOPHEN 325 MG/1
650 TABLET ORAL
Status: CANCELLED | OUTPATIENT
Start: 2022-06-03

## 2022-06-02 RX ORDER — ONDANSETRON 2 MG/ML
8 INJECTION INTRAMUSCULAR; INTRAVENOUS
Status: CANCELLED | OUTPATIENT
Start: 2022-06-03

## 2022-06-02 RX ORDER — SODIUM CHLORIDE 9 MG/ML
INJECTION, SOLUTION INTRAVENOUS ONCE
Status: CANCELLED | OUTPATIENT
Start: 2022-06-03 | End: 2022-06-03

## 2022-06-02 RX ORDER — ACETAMINOPHEN 325 MG/1
650 TABLET ORAL ONCE
Status: CANCELLED
Start: 2022-06-03 | End: 2022-06-03

## 2022-06-02 ASSESSMENT — ENCOUNTER SYMPTOMS
BLOOD IN STOOL: 0
SORE THROAT: 0
CHEST TIGHTNESS: 0
ABDOMINAL PAIN: 0
COLOR CHANGE: 0
VOICE CHANGE: 0
RHINORRHEA: 1
DIARRHEA: 0
SINUS PRESSURE: 0
STRIDOR: 0
SHORTNESS OF BREATH: 0
EYE REDNESS: 0
EYE PAIN: 0
EYE DISCHARGE: 0
WHEEZING: 0
COUGH: 1
VOMITING: 0

## 2022-06-02 NOTE — TELEPHONE ENCOUNTER
There are not any locations that have the monoclonal antibody infusion to my knowledge at this time. You can call the [facility] and ask but they did not think they would be getting any and [facility] has not been able to get any either. She can do an evisit for me to send in paxlovid 5 day oral antiviral treatment however.

## 2022-06-02 NOTE — TELEPHONE ENCOUNTER
Patient called and is wanting an order for outpatient antibody infusion. Patient tested positive for Covid on 5/29/22. Patient is still feeling symptomatic.

## 2022-06-02 NOTE — PROGRESS NOTES
VISIT LOCATION for patient:HOME  Location of this provider: Clinic    Due to [place] [address] outbreak and patient inability to do a tele-health video visit or in-person visit with clinician, tele-health telephone visit without video was performed. Consent:  She and/or health care decision maker is aware that that she may receive a bill for this telephone service, depending on her insurance coverage, and has provided verbal consent to proceed: Yes     The risks and benefits of converting to a virtual visit were discussed in light of the current infectious disease epidemic. Patient also understood that insurance coverage and co-pays are up to their individual insurance plans. Chief Complaint   Patient presents with    Positive For Covid-19       HPI:    Erin Bishop (:  1970) has requested an audio/video evaluation for the following concern(s):    Patient with nasal congestion, sinus drainage, HAs, cough, body aches, and fatigue for past 6 days now due to Covid-19 infection. Patient traveled to [facility] recently to visit her grandchildren and was exposed at that time by her grandchildren who are also sick. Patient has not been vaccinated for covid-19. Patient had hoped to get monoclonal antibody infusion but she is already 6 days into symptoms. Review of Systems   Constitutional: Positive for activity change and fatigue. Negative for appetite change, chills and fever. HENT: Positive for congestion, rhinorrhea and sneezing. Negative for ear pain, sinus pressure, sore throat and voice change. Eyes: Negative for pain, discharge and redness. Respiratory: Positive for cough. Negative for chest tightness, shortness of breath, wheezing and stridor. Cardiovascular: Negative for chest pain and palpitations. Gastrointestinal: Negative for abdominal pain, blood in stool, diarrhea and vomiting. Endocrine: Negative for cold intolerance, heat intolerance and polydipsia.    Genitourinary: Negative for dysuria and hematuria. Musculoskeletal: Positive for myalgias. Negative for arthralgias, neck pain and neck stiffness. Skin: Negative for color change and rash. Neurological: Positive for headaches. Negative for dizziness, tremors, syncope, speech difficulty, weakness and numbness. Hematological: Negative for adenopathy. Does not bruise/bleed easily. Psychiatric/Behavioral: Negative for confusion, dysphoric mood and sleep disturbance. The patient is not nervous/anxious. All other systems reviewed and are negative. Prior to Visit Medications    Medication Sig Taking? Authorizing Provider   nirmatrelvir/ritonavir (PAXLOVID) 20 x 150 MG & 10 x 100MG Take 3 tablets (two 150 mg nirmatrelvir and one 100 mg ritonavir tablets) by mouth every 12 hours for 5 days.   Evelin Berkowitz MD   mirtazapine (REMERON) 15 MG tablet Take 15 mg by mouth nightly  Historical Provider, MD   sertraline (ZOLOFT) 50 MG tablet Take 1.5 tablets by mouth daily  Evelin Berkowitz MD   mirtazapine (REMERON) 7.5 MG tablet Take 0.5 tablets by mouth nightly as needed (sleep)  Evelin Berkowitz MD   omeprazole (PRILOSEC) 40 MG delayed release capsule Take 40 mg by mouth daily  Historical Provider, MD       Past Medical History:   Diagnosis Date    Acute gastritis     Allergic rhinitis     Basal cell carcinoma     Chondromalacia of knee     COVID-19 6/2/2022    Degenerative disc disease, lumbar 9/18/2017    Depression with anxiety     Dermoid cyst of leg     GERD (gastroesophageal reflux disease)     IBS (irritable bowel syndrome)     Lower back pain     Lumbar radiculopathy     Mixed hyperlipidemia 4/11/2022    Muscle spasm of back     Seasonal affective disorder in remission ([facility] 9/18/2017    Thoracic outlet syndrome     Uterine leiomyoma         Past Surgical History:   Procedure Laterality Date    ARTHROPLASTY      BREAST ENHANCEMENT SURGERY Bilateral 1993    saline, pre-pectoral    COSMETIC SURGERY      HYSTERECTOMY, TOTAL ABDOMINAL  2005    uterine fibroids    MOHS SURGERY      upper right extremity        No Known Allergies     PHYSICAL EXAMINATION:    Patient-Reported Vitals 9/11/2021   Patient-Reported Weight 129   Patient-Reported Height 5'3\"         Due to this being a TeleHealth telephone encounter, evaluation with physical exam is not possible. ASSESSMENT/PLAN:  Details of this discussion including any medical advice provided:     Candelario Parrish was seen today for positive for covid-19. Diagnoses and all orders for this visit:    COVID-19 virus infection    --Discussed usual treatment for mild symptoms of COVID-19 upper respiratory infection which is usually symptomatic treatment including recommended over-the-counter Mucinex DM to help with cough and expectorant and prescription for Tessalon Perles to use with the Mucinex DM as needed for cough was also sent  -Encouraged fluids and rest and frequent clearing of chest with coughing to prevent mucus buildup in chest  -Tylenol or ibuprofen as needed for headache, sore throat, or fever if it develops  -Quarantine for 10-14 days from symptom onset  -Monitor O2 sats and if O2 sat consistently 90% or lower or if significant shortness of breath develops, patient needs to be reevaluated urgently either in the ER if moderate to severe symptoms or via primary care physician if mild to moderate symptoms not requiring ER care  -Azithromycin and steroids not clinically indicated   -Monoclonal antibody infusion out of window for treatment  -Paxlovid x5 days prescribed to help decrease risk of severe disease  -Also discussed that immunization for COVID-19 still potentially beneficial after recovery from current illness in patients who were unvaccinated at the time of infection  -Symptoms must be improving for 24-48 hours and patient fever free for >24 hours before return to school or work  -Return if symptoms worsen or fail to improve.     An  electronic signature was used to authenticate this note. --Cassie Murray MD on 6/2/2022 at 6:42 PM    Over 50% of the total visit time of 10 min was spent on counseling and/or coordination of care of:   1. COVID-19 virus infection      19}    I affirm this is a Patient Initiated Episode with an Established Patient who has not had a related appointment within my department in the past 7 days or scheduled within the next 24 hours. Pursuant to the emergency declaration under the [address] [place] [address], [facility] waiver authority and the [employer] Act, this Virtual  Visit was conducted, with patient's consent, to reduce the patient's risk of exposure to COVID-19 and provide continuity of care for an established patient. Services were provided through a video synchronous discussion virtually to substitute for in-person clinic visit.

## 2022-06-03 ENCOUNTER — HOSPITAL ENCOUNTER (OUTPATIENT)
Dept: INFUSION THERAPY | Age: 52
Setting detail: INFUSION SERIES
End: 2022-06-03

## 2022-06-03 NOTE — TELEPHONE ENCOUNTER
She recounted her days since symptoms started and was actually already day 6 of symptoms and she did not want to take the infusion away from someone else who may need it. I did telephone visit with patient instead and sent in paxlovid x5 days to treat instead.

## 2022-06-09 ENCOUNTER — OFFICE VISIT (OUTPATIENT)
Age: 52
End: 2022-06-09
Payer: OTHER GOVERNMENT

## 2022-06-09 ENCOUNTER — HOSPITAL ENCOUNTER (OUTPATIENT)
Dept: GENERAL RADIOLOGY | Age: 52
Discharge: HOME | End: 2022-06-09
Payer: OTHER GOVERNMENT

## 2022-06-09 VITALS
HEART RATE: 69 BPM | WEIGHT: 131.8 LBS | TEMPERATURE: 97.1 F | DIASTOLIC BLOOD PRESSURE: 86 MMHG | RESPIRATION RATE: 18 BRPM | HEIGHT: 63 IN | SYSTOLIC BLOOD PRESSURE: 118 MMHG | BODY MASS INDEX: 23.35 KG/M2

## 2022-06-09 DIAGNOSIS — R05.3 PERSISTENT COUGH: Primary | ICD-10-CM

## 2022-06-09 DIAGNOSIS — R05.3 PERSISTENT COUGH: ICD-10-CM

## 2022-06-09 PROCEDURE — 71046 X-RAY EXAM CHEST 2 VIEWS: CPT | Performed by: RADIOLOGY

## 2022-06-09 PROCEDURE — 71046 X-RAY EXAM CHEST 2 VIEWS: CPT

## 2022-06-09 PROCEDURE — 99213 OFFICE O/P EST LOW 20 MIN: CPT

## 2022-06-09 RX ORDER — METHYLPREDNISOLONE 4 MG/1
TABLET ORAL
Qty: 1 KIT | Refills: 0 | Status: SHIPPED | OUTPATIENT
Start: 2022-06-09 | End: 2022-06-15

## 2022-06-09 ASSESSMENT — ENCOUNTER SYMPTOMS
SHORTNESS OF BREATH: 0
COUGH: 1
SORE THROAT: 0

## 2022-06-09 NOTE — PROGRESS NOTES
[address]  [address] [address] [place] [address]  Dept: [phone]  Dept Fax: [phone]  Loc: [phone]    Lachelle Christensen is a 45 y.o. female who presents today for her medical conditions/complaints as noted below. Lachelle Christensen is c/o of Cough        HPI:     HPI  Sarah Medina presents with complaints of peristent cough since May 4th. She reports she initially tested negative for covid19. She states that after about 2 weeks, she developed worsening symptoms, including congestion. She states that she then tested + for covid19 on 5/28. She states all other symptoms have resolved except the cough has continued. She has not taken anything for it at home. She has not had recent steroids. She denies fever and continued congestion.      Past Medical History:   Diagnosis Date    Acute gastritis     Allergic rhinitis     Basal cell carcinoma     Chondromalacia of knee     COVID-19 6/2/2022    Degenerative disc disease, lumbar 9/18/2017    Depression with anxiety     Dermoid cyst of leg     GERD (gastroesophageal reflux disease)     IBS (irritable bowel syndrome)     Lower back pain     Lumbar radiculopathy     Mixed hyperlipidemia 4/11/2022    Muscle spasm of back     [facility] 9/18/2017    Thoracic outlet syndrome     Uterine leiomyoma      Past Surgical History:   Procedure Laterality Date    ARTHROPLASTY      BREAST ENHANCEMENT SURGERY Bilateral 1993    saline, pre-pectoral    COSMETIC SURGERY      HYSTERECTOMY, TOTAL ABDOMINAL (CERVIX REMOVED)  2005    uterine fibroids    MOHS SURGERY      upper right extremity       Family History   Problem Relation Age of Onset    Depression Mother         MDD with Suicide    High Blood Pressure Father     Heart Disease Father         a-fib    Other Father         DVT    High Blood Pressure Brother     Colon Cancer Maternal Grandfather        Social History Tobacco Use    Smoking status: Former Smoker    Smokeless tobacco: Never Used   Substance Use Topics    Alcohol use: Yes     Alcohol/week: 1.0 standard drink     Types: 1 Cans of beer per week     Comment: weekly      Current Outpatient Medications   Medication Sig Dispense Refill    methylPREDNISolone (MEDROL DOSEPACK) 4 MG tablet Take by mouth. 1 kit 0    mirtazapine (REMERON) 15 MG tablet Take 15 mg by mouth nightly      sertraline (ZOLOFT) 50 MG tablet Take 1.5 tablets by mouth daily 135 tablet 3    mirtazapine (REMERON) 7.5 MG tablet Take 0.5 tablets by mouth nightly as needed (sleep) 45 tablet 3    omeprazole (PRILOSEC) 40 MG delayed release capsule Take 40 mg by mouth daily       No current facility-administered medications for this visit. No Known Allergies    Health Maintenance   Topic Date Due    COVID-19 Vaccine (1) 04/11/2023 (Originally 3/23/1975)    Depression Monitoring  04/11/2023    Breast cancer screen  09/10/2023    Colorectal Cancer Screen  08/21/2025    Lipids  04/05/2027    DTaP/Tdap/Td vaccine (2 - Td or Tdap) 09/20/2028    Flu vaccine  Completed    Shingles vaccine  Completed    Hepatitis C screen  Completed    HIV screen  Completed    Hepatitis A vaccine  Aged Out    Hepatitis B vaccine  Aged Out    Hib vaccine  Aged Out    Meningococcal (ACWY) vaccine  Aged Out    Pneumococcal 0-64 years Vaccine  Aged Out       Subjective:     Review of Systems   Constitutional: Negative for fever. HENT: Negative for congestion and sore throat. Respiratory: Positive for cough. Negative for shortness of breath. Neurological: Negative for headaches.       :Objective      Physical Exam  Constitutional:       General: She is not in acute distress. Appearance: Normal appearance. She is not toxic-appearing. HENT:      Head: Normocephalic and atraumatic.       Right Ear: Tympanic membrane, ear canal and external ear normal.      Left Ear: Tympanic membrane, ear canal and external ear normal.      Nose: Nose normal.      Mouth/Throat:      Mouth: Mucous membranes are moist.      Pharynx: Oropharynx is clear. Posterior oropharyngeal erythema (mild) present. No oropharyngeal exudate. Eyes:      General:         Right eye: No discharge. Left eye: No discharge. Conjunctiva/sclera: Conjunctivae normal.   Cardiovascular:      Rate and Rhythm: Normal rate and regular rhythm. Pulmonary:      Effort: Pulmonary effort is normal. No respiratory distress. Breath sounds: Normal breath sounds. Abdominal:      General: Abdomen is flat. Palpations: Abdomen is soft. Musculoskeletal:         General: Normal range of motion. Cervical back: Normal range of motion. Lymphadenopathy:      Cervical: No cervical adenopathy. Skin:     General: Skin is warm and dry. Capillary Refill: Capillary refill takes less than 2 seconds. Findings: No rash. Neurological:      General: No focal deficit present. Mental Status: She is alert. Psychiatric:         Mood and Affect: Mood normal.       /86   Pulse 69   Temp 97.1 °F (36.2 °C) (Temporal)   Resp 18   Ht 5' 3\" (1.6 m)   Wt 131 lb 12.8 oz (59.8 kg)   LMP 05/20/2017   BMI 23.35 kg/m²     :Assessment       Diagnosis Orders   1. Persistent cough  XR CHEST STANDARD (2 VW)    methylPREDNISolone (MEDROL DOSEPACK) 4 MG tablet       :Plan    CXR to r/o pneumonia or other suspicious process. Treat inflammation with steroid - pt requests pills and no shot. Return precautions and home care education completed. Patient verbalized understanding. Orders Placed This Encounter   Procedures    XR CHEST STANDARD (2 VW)     Standing Status:   Future     Standing Expiration Date:   6/9/2023     Order Specific Question:   Reason for exam:     Answer:   recent covid 19 infection - perisistent cough       No follow-ups on file.     Orders Placed This Encounter   Medications    methylPREDNISolone (MEDROL DOSEPACK) 4 your doctor about stop-smoking programs and medicines. These can increase your chances of quitting for good. When should you call for help? Call 911 anytime you think you may need emergency care. For example, call if:     You have severe trouble breathing. Call your doctor now or seek immediate medical care if:     You cough up blood.      You have new or worse trouble breathing.      You have a new or higher fever.      You have a new rash. Watch closely for changes in your health, and be sure to contact your doctor if:     You cough more deeply or more often, especially if you notice more mucus or a change in the color of your mucus.      You have new symptoms, such as a sore throat, an earache, or sinus pain.      You do not get better as expected. Where can you learn more? Go to [email or web address]://Wote.[email or web address] org and sign in to your ItsMyURLs account. Enter D279 in the [employer]works box to learn more about \"Cough: Care Instructions. \"     If you do not have an account, please click on the \"Sign Up Now\" link. Current as of: July 6, 2021               Content Version: 13.2  © 9390-4781 Healthwise, Incorporated. Care instructions adapted under license by [facility] ([facility]). If you have questions about a medical condition or this instruction, always ask your healthcare professional. [address]  any warranty or liability for your use of this information.                Electronically signed by JAMEL Montero CNP on 6/9/2022 at 6:37 PM

## 2022-06-09 NOTE — PATIENT INSTRUCTIONS
1. Chest xray results will be called to you when they are available. 2. Steroid pack per label instructions  3. Hydrate with water or gatorade  4. OTC cough/cold medications are okay -follow label instructions  5. Tylenol or motrin for pain or fever  6. Warm salt water gargles or warm liquids for comfort. Warm tea with a tablespoon of honey is excellent for sore throat. 7. If symptoms worsen, please seek reevaluation    Patient Education        Cough: Care Instructions  Overview     A cough is your body's response to something that bothers your throat or airways. Many things can cause a cough. You might cough because of a cold or the flu, bronchitis, or asthma. Smoking, postnasal drip, allergies, and stomachacid that backs up into your throat also can cause coughs. A cough is a symptom, not a disease. Most coughs stop when the cause, such as acold, goes away. You can take a few steps at home to cough less and feel better. Follow-up care is a key part of your treatment and safety. Be sure to make and go to all appointments, and call your doctor if you are having problems. It's also a good idea to know your test results and keep alist of the medicines you take. How can you care for yourself at home?  Drink lots of water and other fluids. This helps thin the mucus and soothes a dry or sore throat. Honey or lemon juice in hot water or tea may ease a dry cough.  Take cough medicine as directed by your doctor.  Prop up your head on pillows to help you breathe and ease a dry cough.  Try cough drops or hard candy to soothe a dry or sore throat.  Do not smoke. Avoid secondhand smoke. If you need help quitting, talk to your doctor about stop-smoking programs and medicines. These can increase your chances of quitting for good. When should you call for help? Call 911 anytime you think you may need emergency care. For example, call if:     You have severe trouble breathing.    Call your doctor now or seek immediate medical care if:     You cough up blood.      You have new or worse trouble breathing.      You have a new or higher fever.      You have a new rash. Watch closely for changes in your health, and be sure to contact your doctor if:     You cough more deeply or more often, especially if you notice more mucus or a change in the color of your mucus.      You have new symptoms, such as a sore throat, an earache, or sinus pain.      You do not get better as expected. Where can you learn more? Go to [email or web address].[email or web address] org and sign in to your uTrail me account. Enter D279 in the Panelfly box to learn more about \"Cough: Care Instructions. \"     If you do not have an account, please click on the \"Sign Up Now\" link. Current as of: July 6, 2021               Content Version: 13.2  © 2544-8101 Healthwise, Incorporated. Care instructions adapted under license by [facility] ([facility]). If you have questions about a medical condition or this instruction, always ask your healthcare professional. [address] any warranty or liability for your use of this information.

## 2022-07-28 ENCOUNTER — OFFICE VISIT (OUTPATIENT)
Dept: FAMILY MEDICINE CLINIC | Age: 52
End: 2022-07-28

## 2022-07-28 VITALS
WEIGHT: 132 LBS | SYSTOLIC BLOOD PRESSURE: 122 MMHG | OXYGEN SATURATION: 97 % | DIASTOLIC BLOOD PRESSURE: 80 MMHG | HEIGHT: 63 IN | BODY MASS INDEX: 23.39 KG/M2 | TEMPERATURE: 98 F | HEART RATE: 75 BPM

## 2022-07-28 DIAGNOSIS — F51.01 PRIMARY INSOMNIA: ICD-10-CM

## 2022-07-28 DIAGNOSIS — F33.0 MILD EPISODE OF RECURRENT MAJOR DEPRESSIVE DISORDER (HCC): Primary | ICD-10-CM

## 2022-07-28 DIAGNOSIS — E55.9 AVITAMINOSIS D: ICD-10-CM

## 2022-07-28 DIAGNOSIS — E78.00 PURE HYPERCHOLESTEROLEMIA: ICD-10-CM

## 2022-07-28 PROCEDURE — 99214 OFFICE O/P EST MOD 30 MIN: CPT | Performed by: INTERNAL MEDICINE

## 2022-07-28 RX ORDER — HYDROXYZINE HYDROCHLORIDE 25 MG/1
25 TABLET, FILM COATED ORAL EVERY 8 HOURS PRN
Qty: 60 TABLET | Refills: 2 | Status: SHIPPED | OUTPATIENT
Start: 2022-07-28 | End: 2022-10-31

## 2022-07-28 ASSESSMENT — ENCOUNTER SYMPTOMS
EYE DISCHARGE: 0
COLOR CHANGE: 0
RHINORRHEA: 0
VOICE CHANGE: 0
DIARRHEA: 0
SHORTNESS OF BREATH: 0
EYE REDNESS: 0
WHEEZING: 0
BLOOD IN STOOL: 0
SORE THROAT: 0
VOMITING: 0
SINUS PRESSURE: 0
EYE PAIN: 0
CHEST TIGHTNESS: 0
ABDOMINAL PAIN: 0
COUGH: 0

## 2022-07-28 NOTE — PROGRESS NOTES
Latrice George is a 45 y.o. female who presents today for   Chief Complaint   Patient presents with    Follow-up     Re check mood    Insomnia       HPI  54y/o WF here for follow up on mild major depressive disorder and primary insomnia. Patient is taking sertraline 75 mg for depression but the mirtazapine causing next day fatigue and hangover type feeling. Lowering the dose did not help and she is not sleeping and exhausted. She has trouble falling asleep and staying asleep. Melatonin does not help her sleep but she has not tried diphenhydramine. She had Covid-19 infection at the end of May 2022 which and she did not end up taking paxlovid. PHQ Scores 4/11/2022 9/20/2018 9/18/2017   PHQ2 Score 3 0 0   PHQ9 Score 13 0 0     Interpretation of Total Score Depression Severity: 1-4 = Minimal depression, 5-9 = Mild depression, 10-14 = Moderate depression, 15-19 = Moderately severe depression, 20-27 = Severe depression    Review of Systems   Constitutional:  Positive for fatigue. Negative for appetite change, chills, fever and unexpected weight change. HENT:  Negative for ear pain, rhinorrhea, sinus pressure, sore throat and voice change. Eyes:  Negative for pain, discharge and redness. Respiratory:  Negative for cough, chest tightness, shortness of breath and wheezing. Cardiovascular:  Negative for chest pain and palpitations. Gastrointestinal:  Negative for abdominal pain, blood in stool, diarrhea and vomiting. Endocrine: Negative for cold intolerance, heat intolerance and polydipsia. Genitourinary:  Negative for dysuria and hematuria. Musculoskeletal:  Negative for arthralgias, myalgias, neck pain and neck stiffness. Skin:  Negative for color change and rash. Neurological:  Negative for dizziness, tremors, syncope, speech difficulty, weakness, numbness and headaches. Hematological:  Negative for adenopathy. Does not bruise/bleed easily.    Psychiatric/Behavioral:  Positive for dysphoric mood and sleep disturbance. Negative for confusion, self-injury and suicidal ideas. The patient is nervous/anxious. See HPI   All other systems reviewed and are negative. Past Medical History:   Diagnosis Date    Acute gastritis     Allergic rhinitis     Basal cell carcinoma     Chondromalacia of knee     COVID-19 6/2/2022    Degenerative disc disease, lumbar 9/18/2017    Depression with anxiety     Dermoid cyst of leg     GERD (gastroesophageal reflux disease)     IBS (irritable bowel syndrome)     Lower back pain     Lumbar radiculopathy     Mixed hyperlipidemia 4/11/2022    Muscle spasm of back     Seasonal affective disorder in remission ([facility] [address] 9/18/2017    Thoracic outlet syndrome     Uterine leiomyoma        Current Outpatient Medications   Medication Sig Dispense Refill    hydrOXYzine HCl (ATARAX) 25 MG tablet Take 1 tablet by mouth every 8 hours as needed (insomnia/sleep) 60 tablet 2    sertraline (ZOLOFT) 50 MG tablet Take 1.5 tablets by mouth daily 135 tablet 3    omeprazole (PRILOSEC) 40 MG delayed release capsule Take 40 mg by mouth daily       No current facility-administered medications for this visit. No Known Allergies    Past Surgical History:   Procedure Laterality Date    ARTHROPLASTY      BREAST ENHANCEMENT SURGERY Bilateral 1993    saline, pre-pectoral    COSMETIC SURGERY      HYSTERECTOMY, TOTAL ABDOMINAL (CERVIX REMOVED)  2005    uterine fibroids    MOHS SURGERY      upper right extremity       Social History     Tobacco Use    Smoking status: Former    Smokeless tobacco: Never   Vaping Use    Vaping Use: Never used   Substance Use Topics    Alcohol use:  Yes     Alcohol/week: 1.0 standard drink     Types: 1 Cans of beer per week     Comment: weekly    Drug use: Never       Family History   Problem Relation Age of Onset    Depression Mother         MDD with Suicide    High Blood Pressure Father     Heart Disease Father         a-fib    Other Father         DVT    High Blood Pressure Brother     Colon Cancer Maternal Grandfather        /80   Pulse 75   Temp 98 °F (36.7 °C) (Temporal)   Ht 5' 3\" (1.6 m)   Wt 132 lb (59.9 kg)   LMP 05/20/2017   SpO2 97%   BMI 23.38 kg/m²     Physical Exam  Vitals reviewed. Constitutional:       General: She is not in acute distress. Appearance: Normal appearance. She is well-developed and normal weight. She is not ill-appearing or toxic-appearing. HENT:      Head: Normocephalic and atraumatic. Right Ear: External ear normal.      Left Ear: External ear normal.      Nose: Nose normal.      Mouth/Throat:      Lips: Pink. Mouth: Mucous membranes are moist.   Eyes:      General:         Right eye: No discharge. Left eye: No discharge. Conjunctiva/sclera: Conjunctivae normal.      Pupils: Pupils are equal, round, and reactive to light. Neck:      Thyroid: No thyromegaly. Vascular: Normal carotid pulses. No carotid bruit or JVD. Trachea: Trachea and phonation normal. No tracheal tenderness. Cardiovascular:      Rate and Rhythm: Normal rate and regular rhythm. Pulses:           Carotid pulses are 2+ on the right side and 2+ on the left side. Posterior tibial pulses are 2+ on the right side and 2+ on the left side. Heart sounds: Normal heart sounds. No murmur heard. No friction rub. No gallop. Pulmonary:      Effort: Pulmonary effort is normal. No accessory muscle usage. Breath sounds: Normal breath sounds. No decreased breath sounds, wheezing, rhonchi or rales. Chest:   Breasts:     Right: No supraclavicular adenopathy. Left: No supraclavicular adenopathy. Abdominal:      General: Bowel sounds are normal. There is no distension. Palpations: Abdomen is soft. There is no mass. Tenderness: There is no abdominal tenderness. There is no guarding or rebound. Hernia: No hernia is present. Musculoskeletal:         General: No swelling, tenderness or deformity.       Right wrist: Normal.      Left wrist: Normal.      Cervical back: Normal range of motion and neck supple. No rigidity. No muscular tenderness. Right lower leg: No edema. Left lower leg: No edema. Right ankle: Normal.      Left ankle: Normal.   Lymphadenopathy:      Cervical: No cervical adenopathy. Upper Body:      Right upper body: No supraclavicular adenopathy. Left upper body: No supraclavicular adenopathy. Skin:     General: Skin is warm. Capillary Refill: Capillary refill takes less than 2 seconds. Coloration: Skin is not cyanotic. Findings: No rash. Nails: There is no clubbing. Neurological:      Mental Status: She is alert and oriented to person, place, and time. Cranial Nerves: No cranial nerve deficit or dysarthria. Motor: No weakness, tremor or abnormal muscle tone. Coordination: Coordination normal.      Gait: Gait is intact. Comments: CN II-XII grossly intact, speech clear, no facial droop, MAEW   Psychiatric:         Attention and Perception: Attention and perception normal.         Mood and Affect: Mood and affect normal.         Speech: Speech normal.         Behavior: Behavior normal. Behavior is cooperative. Thought Content: Thought content normal.         Cognition and Memory: Cognition and memory normal.         Judgment: Judgment normal.       No results found for this visit on 07/28/22. Assessment:    ICD-10-CM    1. Mild episode of recurrent major depressive disorder (HCC)  F33.0       2. Primary insomnia  F51.01 hydrOXYzine HCl (ATARAX) 25 MG tablet      3. Avitaminosis D  E55.9 Vitamin D 25 Hydroxy      4. Pure hypercholesterolemia  E78.00 Comprehensive Metabolic Panel     Lipid Panel          Plan:  Preston Spaulding was seen today for follow-up and insomnia.     Diagnoses and all orders for this visit:    Mild episode of recurrent major depressive disorder ([facility] [address]    Primary insomnia  -     hydrOXYzine HCl (ATARAX) 25 MG tablet; tablet by mouth every 8 hours as needed (insomnia/sleep)     Dispense:  60 tablet     Refill:  2     Medications Discontinued During This Encounter   Medication Reason    mirtazapine (REMERON) 15 MG tablet LIST CLEANUP    mirtazapine (REMERON) 7.5 MG tablet Side effects     There are no Patient Instructions on file for this visit. Patient voices understanding and agrees to plans along with risks and benefits of plan. Counseling:  Eric Dong's case, medications and options were discussed in detail. patient was instructed tocall the office if she   questions regarding her treatment. Should her conditions worsen, she should return to office to be reassessed by Dr. Danay Sheppard. she  Should to go the closest Emergency Department for any emergency. They verbalized understanding the above instructions.

## 2022-09-27 ENCOUNTER — HOSPITAL ENCOUNTER (OUTPATIENT)
Dept: WOMENS IMAGING | Age: 52
Discharge: HOME | End: 2022-09-27
Payer: OTHER GOVERNMENT

## 2022-09-27 DIAGNOSIS — Z12.31 BREAST CANCER SCREENING BY MAMMOGRAM: ICD-10-CM

## 2022-09-27 PROCEDURE — 77063 BREAST TOMOSYNTHESIS BI: CPT

## 2022-10-24 DIAGNOSIS — E78.00 PURE HYPERCHOLESTEROLEMIA: ICD-10-CM

## 2022-10-24 DIAGNOSIS — E55.9 AVITAMINOSIS D: ICD-10-CM

## 2022-10-24 LAB
ALBUMIN SERPL-MCNC: 4.8 G/DL (ref 3.5–5.2)
ALP BLD-CCNC: 104 U/L (ref 35–104)
ALT SERPL-CCNC: 20 U/L (ref 5–33)
ANION GAP SERPL CALCULATED.3IONS-SCNC: 10 MMOL/L (ref 7–19)
AST SERPL-CCNC: 17 U/L (ref 5–32)
BILIRUB SERPL-MCNC: <0.2 MG/DL (ref 0.2–1.2)
BUN BLDV-MCNC: 16 MG/DL (ref 6–20)
CALCIUM SERPL-MCNC: 9.7 MG/DL (ref 8.6–10)
CHLORIDE BLD-SCNC: 103 MMOL/L (ref 98–111)
CHOLESTEROL, TOTAL: 228 MG/DL (ref 160–199)
CO2: 28 MMOL/L (ref 22–29)
CREAT SERPL-MCNC: 0.7 MG/DL (ref 0.5–0.9)
GFR SERPL CREATININE-BSD FRML MDRD: >60 ML/MIN/{1.73_M2}
GLUCOSE BLD-MCNC: 93 MG/DL (ref 74–109)
HDLC SERPL-MCNC: 78 MG/DL (ref 65–121)
LDL CHOLESTEROL CALCULATED: 130 MG/DL
POTASSIUM SERPL-SCNC: 4.9 MMOL/L (ref 3.5–5)
SODIUM BLD-SCNC: 141 MMOL/L (ref 136–145)
TOTAL PROTEIN: 6.6 G/DL (ref 6.6–8.7)
TRIGL SERPL-MCNC: 98 MG/DL (ref 0–149)
VITAMIN D 25-HYDROXY: 78.2 NG/ML

## 2022-10-31 ENCOUNTER — OFFICE VISIT (OUTPATIENT)
Dept: FAMILY MEDICINE CLINIC | Age: 52
End: 2022-10-31
Payer: OTHER GOVERNMENT

## 2022-10-31 VITALS
HEIGHT: 63 IN | SYSTOLIC BLOOD PRESSURE: 122 MMHG | WEIGHT: 130.5 LBS | TEMPERATURE: 97.1 F | DIASTOLIC BLOOD PRESSURE: 76 MMHG | HEART RATE: 72 BPM | OXYGEN SATURATION: 97 % | BODY MASS INDEX: 23.12 KG/M2

## 2022-10-31 DIAGNOSIS — R68.2 DRY MOUTH: ICD-10-CM

## 2022-10-31 DIAGNOSIS — E78.00 PURE HYPERCHOLESTEROLEMIA: ICD-10-CM

## 2022-10-31 DIAGNOSIS — F33.0 MILD EPISODE OF RECURRENT MAJOR DEPRESSIVE DISORDER (HCC): ICD-10-CM

## 2022-10-31 DIAGNOSIS — E55.9 AVITAMINOSIS D: ICD-10-CM

## 2022-10-31 DIAGNOSIS — F51.01 PRIMARY INSOMNIA: Primary | ICD-10-CM

## 2022-10-31 PROBLEM — U07.1 COVID-19: Status: RESOLVED | Noted: 2022-06-02 | Resolved: 2022-10-31

## 2022-10-31 PROCEDURE — 99214 OFFICE O/P EST MOD 30 MIN: CPT | Performed by: INTERNAL MEDICINE

## 2022-10-31 RX ORDER — ESZOPICLONE 2 MG/1
2 TABLET, FILM COATED ORAL NIGHTLY
Qty: 30 TABLET | Refills: 1 | Status: SHIPPED | OUTPATIENT
Start: 2022-10-31 | End: 2022-11-30

## 2022-10-31 ASSESSMENT — ENCOUNTER SYMPTOMS
RHINORRHEA: 0
BLOOD IN STOOL: 0
SINUS PRESSURE: 0
DIARRHEA: 0
SORE THROAT: 0
EYE PAIN: 0
VOMITING: 0
WHEEZING: 0
COLOR CHANGE: 0
CHEST TIGHTNESS: 0
VOICE CHANGE: 0
EYE DISCHARGE: 0
ABDOMINAL PAIN: 0
EYE REDNESS: 0
SHORTNESS OF BREATH: 0
COUGH: 0

## 2022-10-31 NOTE — PROGRESS NOTES
Rita Adames is a 45 y.o. female who presents today for   Chief Complaint   Patient presents with    3 Month Follow-Up    Insomnia    Hyperlipidemia       HPI  54y/o WF here for follow up on pure hyperlipidemia, vitamin D deficiency, mild major depressive disorder, and primary insomnia. The hydroxyzine did not help with insomnia at all and she took mirtazapine previously, which caused daytime fatigue/hangover effect, even at lower dose. She is still having trouble falling asleep and staying asleep at night. Patient feels like her depression would be better controlled if she could just sleep better. Patient c/o having issues with dry mouth and new cavities recently but she is not sure what is causing it. She does not think she is snoring or sleeping with her mouth open. She has also been having a metallic taste in her mouth all day recently like when she had covid-19 infection. PHQ Scores 4/11/2022 9/20/2018 9/18/2017   PHQ2 Score 3 0 0   PHQ9 Score 13 0 0     Interpretation of Total Score Depression Severity: 1-4 = Minimal depression, 5-9 = Mild depression, 10-14 = Moderate depression, 15-19 = Moderately severe depression, 20-27 = Severe depression      Review of Systems   Constitutional:  Positive for fatigue. Negative for appetite change, chills, fever and unexpected weight change. HENT:  Positive for ear discharge. Negative for ear pain, rhinorrhea, sinus pressure, sore throat and voice change. See HPI   Eyes:  Negative for pain, discharge and redness. Respiratory:  Negative for cough, chest tightness, shortness of breath and wheezing. Cardiovascular:  Negative for chest pain and palpitations. Gastrointestinal:  Negative for abdominal pain, blood in stool, diarrhea and vomiting. Endocrine: Negative for cold intolerance, heat intolerance and polydipsia. Genitourinary:  Negative for dysuria and hematuria.    Musculoskeletal:  Negative for arthralgias, myalgias, neck pain and neck stiffness. Skin:  Negative for color change and rash. Neurological:  Negative for dizziness, tremors, syncope, speech difficulty, weakness, numbness and headaches. Hematological:  Negative for adenopathy. Does not bruise/bleed easily. Psychiatric/Behavioral:  Positive for dysphoric mood and sleep disturbance. Negative for confusion, self-injury and suicidal ideas. The patient is not nervous/anxious. See HPI   All other systems reviewed and are negative. Past Medical History:   Diagnosis Date    Acute gastritis     Allergic rhinitis     Basal cell carcinoma     Chondromalacia of knee     COVID-19 6/2/2022    Degenerative disc disease, lumbar 9/18/2017    Depression with anxiety     Dermoid cyst of leg     GERD (gastroesophageal reflux disease)     IBS (irritable bowel syndrome)     Lower back pain     Lumbar radiculopathy     Mixed hyperlipidemia 4/11/2022    Muscle spasm of back     [facility]al [facility] 9/18/2017    Thoracic outlet syndrome     Uterine leiomyoma        Current Outpatient Medications   Medication Sig Dispense Refill    eszopiclone (LUNESTA) 2 MG TABS Take 1 tablet by mouth nightly for 30 days. 30 tablet 1    sertraline (ZOLOFT) 50 MG tablet Take 1.5 tablets by mouth daily 135 tablet 3     No current facility-administered medications for this visit. No Known Allergies    Past Surgical History:   Procedure Laterality Date    ARTHROPLASTY      BREAST ENHANCEMENT SURGERY Bilateral 1993    saline, pre-pectoral    COSMETIC SURGERY      HYSTERECTOMY, TOTAL ABDOMINAL (CERVIX REMOVED)  2005    uterine fibroids    MOHS SURGERY      upper right extremity       Social History     Tobacco Use    Smoking status: Former    Smokeless tobacco: Never   Vaping Use    Vaping Use: Never used   Substance Use Topics    Alcohol use:  Yes     Alcohol/week: 1.0 standard drink     Types: 1 Cans of beer per week     Comment: weekly    Drug use: Never       Family History Problem Relation Age of Onset    Depression Mother         MDD with Suicide    High Blood Pressure Father     Heart Disease Father         a-fib    Other Father         DVT    High Blood Pressure Brother     Colon Cancer Maternal Grandfather        /76   Pulse 72   Temp 97.1 °F (36.2 °C)   Ht 5' 3\" (1.6 m)   Wt 130 lb 8 oz (59.2 kg)   LMP 05/20/2017   SpO2 97%   BMI 23.12 kg/m²     Physical Exam  Vitals reviewed. Constitutional:       General: She is not in acute distress. Appearance: Normal appearance. She is well-developed and normal weight. She is not ill-appearing or toxic-appearing. HENT:      Head: Normocephalic and atraumatic. Right Ear: External ear normal.      Left Ear: External ear normal.      Nose: Nose normal.      Mouth/Throat:      Lips: Pink. Mouth: Mucous membranes are moist.   Eyes:      General:         Right eye: No discharge. Left eye: No discharge. Conjunctiva/sclera: Conjunctivae normal.      Pupils: Pupils are equal, round, and reactive to light. Neck:      Thyroid: No thyromegaly. Vascular: Normal carotid pulses. No carotid bruit or JVD. Trachea: Trachea and phonation normal. No tracheal tenderness. Cardiovascular:      Rate and Rhythm: Normal rate and regular rhythm. Pulses:           Carotid pulses are 2+ on the right side and 2+ on the left side. Posterior tibial pulses are 2+ on the right side and 2+ on the left side. Heart sounds: Normal heart sounds. No murmur heard. No friction rub. No gallop. Pulmonary:      Effort: Pulmonary effort is normal. No accessory muscle usage. Breath sounds: Normal breath sounds. No decreased breath sounds, wheezing, rhonchi or rales. Abdominal:      General: Bowel sounds are normal. There is no distension. Palpations: Abdomen is soft. There is no mass. Tenderness: There is no abdominal tenderness. There is no guarding or rebound.       Hernia: No hernia is present. Musculoskeletal:         General: No swelling, tenderness or deformity. Right wrist: Normal.      Left wrist: Normal.      Cervical back: Normal range of motion and neck supple. No rigidity. No muscular tenderness. Right lower leg: No edema. Left lower leg: No edema. Right ankle: Normal.      Left ankle: Normal.   Lymphadenopathy:      Cervical: No cervical adenopathy. Upper Body:      Right upper body: No supraclavicular adenopathy. Left upper body: No supraclavicular adenopathy. Skin:     General: Skin is warm. Capillary Refill: Capillary refill takes less than 2 seconds. Coloration: Skin is not cyanotic. Findings: No rash. Nails: There is no clubbing. Neurological:      Mental Status: She is alert and oriented to person, place, and time. Cranial Nerves: No cranial nerve deficit or dysarthria. Motor: No weakness, tremor or abnormal muscle tone. Coordination: Coordination normal.      Gait: Gait is intact. Comments: CN II-XII grossly intact, speech clear, no facial droop, MAEW   Psychiatric:         Attention and Perception: Attention and perception normal.         Mood and Affect: Mood and affect normal.         Speech: Speech normal.         Behavior: Behavior normal. Behavior is cooperative. Thought Content:  Thought content normal.         Cognition and Memory: Cognition and memory normal.         Judgment: Judgment normal.       Lab Results   Component Value Date    WBC 5.0 04/05/2022    HGB 13.1 04/05/2022    HCT 41.3 04/05/2022    MCV 97.4 04/05/2022     04/05/2022    LYMPHOPCT 40.6 (H) 04/05/2022    RBC 4.24 04/05/2022    MCH 30.9 04/05/2022    MCHC 31.7 (L) 04/05/2022    RDW 12.2 04/05/2022     Lab Results   Component Value Date     10/24/2022    K 4.9 10/24/2022     10/24/2022    CO2 28 10/24/2022    BUN 16 10/24/2022    CREATININE 0.7 10/24/2022    GLUCOSE 93 10/24/2022    CALCIUM 9.7 10/24/2022 PROT 6.6 10/24/2022    LABALBU 4.8 10/24/2022    BILITOT <0.2 10/24/2022    ALKPHOS 104 10/24/2022    AST 17 10/24/2022    ALT 20 10/24/2022    LABGLOM >60 10/24/2022    GFRAA >59 04/05/2022        Lab Results   Component Value Date    CHOL 228 (H) 10/24/2022    CHOL 252 (H) 04/05/2022    CHOL 234 (H) 03/25/2021     Lab Results   Component Value Date    TRIG 98 10/24/2022    TRIG 63 04/05/2022    TRIG 59 03/25/2021     Lab Results   Component Value Date    HDL 78 10/24/2022     04/05/2022    HDL 98 03/25/2021     Lab Results   Component Value Date    LDLCALC 130 10/24/2022    LDLCALC 139 04/05/2022    LDLCALC 124 03/25/2021     Lab Results   Component Value Date    VITD25 78.2 10/24/2022        Assessment:    ICD-10-CM    1. Primary insomnia  F51.01 eszopiclone (LUNESTA) 2 MG TABS      2. Pure hypercholesterolemia  E78.00       3. Avitaminosis D  E55.9       4. Mild episode of recurrent major depressive disorder ([facility])  F33.0       5. Dry mouth  R68.2           Plan:  Ghazala Chapman was seen today for 3 month follow-up, insomnia and hyperlipidemia. Diagnoses and all orders for this visit:    Primary insomnia  -     eszopiclone (LUNESTA) 2 MG TABS; Take 1 tablet by mouth nightly for 30 days. Pure hypercholesterolemia    Avitaminosis D    Mild episode of recurrent major depressive disorder ([facility]    Dry mouth      Labs reviewed with patient.  -Primary insomnia-patient is agreeable to trial of eszopiclone given that she has failed over the counter sleep aides and several prescription medicines also now. PAPITO report reviewed. Will obtain UDS and controlled substance contract if patient continues this medication.   -Mild Pure Hyperlipidemia improving but not well controlled. Encouraged efforts at low carbohydrate diet, exercise, and weight loss.    -Vitamin D deficiency well controlled with current vitamin D replacement, which was continued today  -Major Depression overall controlled with sertraline per patient history which she would like to continue at this time. Encouraged Exercise and relaxation techniques for stress relief. -Dry mouth-discussed using over the counter mouth wash and throat lozenges for dry mouth as needed and follow up with dentist as scheduled. -Return in about 6 months (around 4/30/2023) for ECPE, recheck mood, high cholesterol. Over 50% of the total visit time of 30 minutes was spent on counseling and/or coordination of care of:   1. Primary insomnia    2. Pure hypercholesterolemia    3. Avitaminosis D    4. Mild episode of recurrent major depressive disorder ([facility]    5. Dry mouth         No orders of the defined types were placed in this encounter. Orders Placed This Encounter   Medications    eszopiclone (LUNESTA) 2 MG TABS     Sig: Take 1 tablet by mouth nightly for 30 days. Dispense:  30 tablet     Refill:  1     Medications Discontinued During This Encounter   Medication Reason    hydrOXYzine HCl (ATARAX) 25 MG tablet Patient Choice    omeprazole (PRILOSEC) 40 MG delayed release capsule LIST CLEANUP     There are no Patient Instructions on file for this visit. Patient voices understanding and agrees to plans along with risks and benefits of plan. Counseling:  Malaika Alcazar Letitia's case, medications and options were discussed in detail. patient was instructed to call the office if she   questions regarding her treatment. Should her conditions worsen, she should return to office to be reassessed by Dr. Moni Galo. she  Should to go the closest Emergency Department for any emergency. They verbalized understanding the above instructions.

## 2022-11-25 ENCOUNTER — HOSPITAL ENCOUNTER (EMERGENCY)
Age: 52
Discharge: HOME | End: 2022-11-25
Payer: OTHER GOVERNMENT

## 2022-11-25 VITALS
TEMPERATURE: 98.6 F | OXYGEN SATURATION: 94 % | HEART RATE: 92 BPM | DIASTOLIC BLOOD PRESSURE: 88 MMHG | RESPIRATION RATE: 16 BRPM | SYSTOLIC BLOOD PRESSURE: 132 MMHG

## 2022-11-25 DIAGNOSIS — J10.1 INFLUENZA A: Primary | ICD-10-CM

## 2022-11-25 LAB
ADENOVIRUS BY PCR: NOT DETECTED
BORDETELLA PARAPERTUSSIS BY PCR: NOT DETECTED
BORDETELLA PERTUSSIS BY PCR: NOT DETECTED
CHLAMYDOPHILIA PNEUMONIAE BY PCR: NOT DETECTED
CORONAVIRUS 229E BY PCR: NOT DETECTED
CORONAVIRUS HKU1 BY PCR: NOT DETECTED
CORONAVIRUS NL63 BY PCR: NOT DETECTED
CORONAVIRUS OC43 BY PCR: NOT DETECTED
HUMAN METAPNEUMOVIRUS BY PCR: NOT DETECTED
HUMAN RHINOVIRUS/ENTEROVIRUS BY PCR: NOT DETECTED
INFLUENZA A H3 BY PCR: DETECTED
INFLUENZA B BY PCR: NOT DETECTED
MYCOPLASMA PNEUMONIAE BY PCR: NOT DETECTED
PARAINFLUENZA VIRUS 1 BY PCR: NOT DETECTED
PARAINFLUENZA VIRUS 2 BY PCR: NOT DETECTED
PARAINFLUENZA VIRUS 3 BY PCR: NOT DETECTED
PARAINFLUENZA VIRUS 4 BY PCR: NOT DETECTED
RESPIRATORY SYNCYTIAL VIRUS BY PCR: NOT DETECTED
SARS-COV-2, PCR: NOT DETECTED

## 2022-11-25 PROCEDURE — 0202U NFCT DS 22 TRGT SARS-COV-2: CPT

## 2022-11-25 PROCEDURE — 99283 EMERGENCY DEPT VISIT LOW MDM: CPT

## 2022-11-25 ASSESSMENT — ENCOUNTER SYMPTOMS
CHEST TIGHTNESS: 0
VOMITING: 0
SHORTNESS OF BREATH: 0
ABDOMINAL PAIN: 0
NAUSEA: 0
BACK PAIN: 0
COUGH: 1

## 2022-11-25 NOTE — ED PROVIDER NOTES
[facility] EMERGENCY DEPT  EMERGENCY DEPARTMENT ENCOUNTER      Pt Name: Karina Blanca  MRN: [id number]  [place] 1970  Date of evaluation: 11/25/2022  Provider: Esther Gorman, APRN - [facility]       Chief Complaint   Patient presents with    Fever    Cough    URI     Started Tuesday night. HISTORY OF PRESENT ILLNESS   (Location/Symptom, Timing/Onset, Context/Setting, Quality, Duration, Modifying Factors, Severity)  Note limiting factors. Karina Blanca is a 45 y.o. female who presents to the emergency department with cough, congestion, fever since Tuesday. Her adult son came to visit and had similar symptoms prior to her symptoms starting. No short of breath or chest pain. No n/v.     HPI    Nursing Notes were reviewed. REVIEW OF SYSTEMS    (2-9 systems for level 4, 10 or more for level 5)     Review of Systems   Constitutional:  Positive for chills and fever. HENT:  Positive for congestion. Respiratory:  Positive for cough. Negative for chest tightness and shortness of breath. Cardiovascular:  Negative for chest pain and palpitations. Gastrointestinal:  Negative for abdominal pain, nausea and vomiting. Musculoskeletal:  Positive for myalgias. Negative for back pain and neck pain. Neurological:  Positive for headaches. Negative for dizziness, syncope, weakness and light-headedness. Except as noted above the remainder of the review of systems was reviewed and negative.        PAST MEDICAL HISTORY     Past Medical History:   Diagnosis Date    Acute gastritis     Allergic rhinitis     Basal cell carcinoma     Chondromalacia of knee     COVID-19 6/2/2022    Degenerative disc disease, lumbar 9/18/2017    Depression with anxiety     Dermoid cyst of leg     GERD (gastroesophageal reflux disease)     IBS (irritable bowel syndrome)     Lower back pain     Lumbar radiculopathy     Mixed hyperlipidemia 4/11/2022    Muscle spasm of back     Seasonal affective disorder in remission ([facility] 9/18/2017    Thoracic outlet syndrome     Uterine leiomyoma          SURGICAL HISTORY       Past Surgical History:   Procedure Laterality Date    ARTHROPLASTY      BREAST ENHANCEMENT SURGERY Bilateral 1993    saline, pre-pectoral    COSMETIC SURGERY      HYSTERECTOMY, TOTAL ABDOMINAL (CERVIX REMOVED)  2005    uterine fibroids    MOHS SURGERY      upper right extremity         CURRENT MEDICATIONS       Previous Medications    ESZOPICLONE (LUNESTA) 2 MG TABS    Take 1 tablet by mouth nightly for 30 days. SERTRALINE (ZOLOFT) 50 MG TABLET    Take 1.5 tablets by mouth daily       ALLERGIES     Patient has no known allergies. FAMILY HISTORY       Family History   Problem Relation Age of Onset    Depression Mother         MDD with Suicide    High Blood Pressure Father     Heart Disease Father         a-fib    Other Father         DVT    High Blood Pressure Brother     Colon Cancer Maternal Grandfather           SOCIAL HISTORY       Social History     Socioeconomic History    Marital status:    Occupational History    Occupation:    Tobacco Use    Smoking status: Former    Smokeless tobacco: Never   Vaping Use    Vaping Use: Never used   Substance and Sexual Activity    Alcohol use: Yes     Alcohol/week: 1.0 standard drink     Types: 1 Cans of beer per week     Comment: weekly    Drug use: Never       SCREENINGS         [place] Coma Scale  Eye Opening: Spontaneous  Best Verbal Response: Oriented  Best Motor Response: Obeys commands  [place] Coma Scale Score: 15                     CIWA Assessment  BP: 132/88  Heart Rate: 92                 PHYSICAL EXAM    (up to 7 for level 4, 8 or more for level 5)     ED Triage Vitals [11/25/22 1015]   BP Temp Temp Source Heart Rate Resp SpO2 Height Weight   132/88 98.6 °F (37 °C) Oral 92 16 94 % -- --       Physical Exam  Vitals reviewed. Constitutional:       General: She is not in acute distress. Appearance: Normal appearance. She is ill-appearing.  She is not toxic-appearing or diaphoretic. HENT:      Head: Normocephalic and atraumatic. Right Ear: Tympanic membrane, ear canal and external ear normal.      Left Ear: Tympanic membrane, ear canal and external ear normal.      Nose: Congestion present. Mouth/Throat:      Mouth: Mucous membranes are moist.      Pharynx: Oropharynx is clear. Eyes:      Extraocular Movements: Extraocular movements intact. Conjunctiva/sclera: Conjunctivae normal.      Pupils: Pupils are equal, round, and reactive to light. Cardiovascular:      Rate and Rhythm: Normal rate and regular rhythm. Pulses: Normal pulses. Heart sounds: Normal heart sounds. Pulmonary:      Effort: Pulmonary effort is normal.   Abdominal:      General: Bowel sounds are normal. There is no distension. Palpations: Abdomen is soft. Tenderness: There is no abdominal tenderness. There is no guarding. Musculoskeletal:      Cervical back: Neck supple. No tenderness. Skin:     General: Skin is warm and dry. Capillary Refill: Capillary refill takes less than 2 seconds. Neurological:      General: No focal deficit present. Mental Status: She is alert and oriented to person, place, and time. Cranial Nerves: No cranial nerve deficit. Sensory: No sensory deficit. Motor: No weakness.       Coordination: Coordination normal.       DIAGNOSTIC RESULTS     EKG: All EKG's are interpreted by the Emergency Department Physician who either signs or Co-signs this chart in the absence of a cardiologist.        RADIOLOGY:   Non-plain film images such as CT, Ultrasound and MRI are read by the radiologist. Plain radiographic images are visualized and preliminarily interpreted by the emergency physician with the below findings:        Interpretation per the Radiologist below, if available at the time of this note:    No orders to display         ED BEDSIDE ULTRASOUND:   Performed by ED Physician - none    LABS:  Labs Reviewed   RESPIRATORY PANEL, MOLECULAR, WITH COVID-19 - Abnormal; Notable for the following components:       Result Value    Influenza A H3 by PCR DETECTED (*)     All other components within normal limits       All other labs were within normal range or not returned as of this dictation. EMERGENCY DEPARTMENT COURSE and DIFFERENTIAL DIAGNOSIS/MDM:   Vitals:    Vitals:    11/25/22 1015   BP: 132/88   Pulse: 92   Resp: 16   Temp: 98.6 °F (37 °C)   TempSrc: Oral   SpO2: 94%           MDM        REASSESSMENT          CRITICAL CARE TIME       CONSULTS:  None    PROCEDURES:  Unless otherwise noted below, none     Procedures         FINAL IMPRESSION      1. Influenza A          DISPOSITION/PLAN   DISPOSITION Decision To Discharge 11/25/2022 11:19:12 AM      PATIENT REFERRED TO:  Sulaiman Adame MD  [address]  [phone]    Call in 3 days      DISCHARGE MEDICATIONS:  New Prescriptions    No medications on file     Controlled Substances Monitoring:     No flowsheet data found.     (Please note that portions of this note were completed with a voice recognition program.  Efforts were made to edit the dictations but occasionally words are mis-transcribed.)    JAMEL Hennessy CNP (electronically signed)  Attending Emergency Physician          JAMEL Hennessy CNP  11/25/22 5023

## 2023-01-15 DIAGNOSIS — F51.01 PRIMARY INSOMNIA: ICD-10-CM

## 2023-01-15 RX ORDER — ESZOPICLONE 2 MG/1
2 TABLET, FILM COATED ORAL NIGHTLY
Qty: 30 TABLET | Refills: 0 | Status: SHIPPED | OUTPATIENT
Start: 2023-01-15 | End: 2023-02-14

## 2023-04-13 ENCOUNTER — OFFICE VISIT (OUTPATIENT)
Dept: PRIMARY CARE CLINIC | Age: 53
End: 2023-04-13
Payer: OTHER GOVERNMENT

## 2023-04-13 VITALS
OXYGEN SATURATION: 97 % | BODY MASS INDEX: 23.25 KG/M2 | HEART RATE: 96 BPM | HEIGHT: 63 IN | TEMPERATURE: 97.1 F | WEIGHT: 131.25 LBS | DIASTOLIC BLOOD PRESSURE: 68 MMHG | SYSTOLIC BLOOD PRESSURE: 118 MMHG

## 2023-04-13 DIAGNOSIS — R51.9 HEADACHE DISORDER: ICD-10-CM

## 2023-04-13 DIAGNOSIS — B34.9 VIRAL ILLNESS: Primary | ICD-10-CM

## 2023-04-13 DIAGNOSIS — J02.9 SORE THROAT: ICD-10-CM

## 2023-04-13 LAB — S PYO AG THROAT QL: NORMAL

## 2023-04-13 PROCEDURE — 87880 STREP A ASSAY W/OPTIC: CPT | Performed by: INTERNAL MEDICINE

## 2023-04-13 PROCEDURE — 99213 OFFICE O/P EST LOW 20 MIN: CPT | Performed by: INTERNAL MEDICINE

## 2023-04-13 RX ORDER — AMOXICILLIN 500 MG/1
500 CAPSULE ORAL 3 TIMES DAILY
COMMUNITY
End: 2023-04-26 | Stop reason: ALTCHOICE

## 2023-04-13 SDOH — ECONOMIC STABILITY: TRANSPORTATION INSECURITY
IN THE PAST 12 MONTHS, HAS LACK OF TRANSPORTATION KEPT YOU FROM MEETINGS, WORK, OR FROM GETTING THINGS NEEDED FOR DAILY LIVING?: PATIENT DECLINED

## 2023-04-13 SDOH — ECONOMIC STABILITY: FOOD INSECURITY: WITHIN THE PAST 12 MONTHS, THE FOOD YOU BOUGHT JUST DIDN'T LAST AND YOU DIDN'T HAVE MONEY TO GET MORE.: PATIENT DECLINED

## 2023-04-13 SDOH — ECONOMIC STABILITY: HOUSING INSECURITY
IN THE LAST 12 MONTHS, WAS THERE A TIME WHEN YOU DID NOT HAVE A STEADY PLACE TO SLEEP OR SLEPT IN A SHELTER (INCLUDING NOW)?: PATIENT REFUSED

## 2023-04-13 SDOH — ECONOMIC STABILITY: FOOD INSECURITY: WITHIN THE PAST 12 MONTHS, YOU WORRIED THAT YOUR FOOD WOULD RUN OUT BEFORE YOU GOT MONEY TO BUY MORE.: PATIENT DECLINED

## 2023-04-13 SDOH — ECONOMIC STABILITY: INCOME INSECURITY: HOW HARD IS IT FOR YOU TO PAY FOR THE VERY BASICS LIKE FOOD, HOUSING, MEDICAL CARE, AND HEATING?: PATIENT DECLINED

## 2023-04-13 ASSESSMENT — PATIENT HEALTH QUESTIONNAIRE - PHQ9
3. TROUBLE FALLING OR STAYING ASLEEP: 0
SUM OF ALL RESPONSES TO PHQ QUESTIONS 1-9: 3
2. FEELING DOWN, DEPRESSED OR HOPELESS: 0
SUM OF ALL RESPONSES TO PHQ QUESTIONS 1-9: 3
5. POOR APPETITE OR OVEREATING: 0
9. THOUGHTS THAT YOU WOULD BE BETTER OFF DEAD, OR OF HURTING YOURSELF: 0
SUM OF ALL RESPONSES TO PHQ QUESTIONS 1-9: 3
10. IF YOU CHECKED OFF ANY PROBLEMS, HOW DIFFICULT HAVE THESE PROBLEMS MADE IT FOR YOU TO DO YOUR WORK, TAKE CARE OF THINGS AT HOME, OR GET ALONG WITH OTHER PEOPLE: 0
1. LITTLE INTEREST OR PLEASURE IN DOING THINGS: 0
6. FEELING BAD ABOUT YOURSELF - OR THAT YOU ARE A FAILURE OR HAVE LET YOURSELF OR YOUR FAMILY DOWN: 0
SUM OF ALL RESPONSES TO PHQ QUESTIONS 1-9: 3
SUM OF ALL RESPONSES TO PHQ9 QUESTIONS 1 & 2: 0
7. TROUBLE CONCENTRATING ON THINGS, SUCH AS READING THE NEWSPAPER OR WATCHING TELEVISION: 3
4. FEELING TIRED OR HAVING LITTLE ENERGY: 0
8. MOVING OR SPEAKING SO SLOWLY THAT OTHER PEOPLE COULD HAVE NOTICED. OR THE OPPOSITE, BEING SO FIGETY OR RESTLESS THAT YOU HAVE BEEN MOVING AROUND A LOT MORE THAN USUAL: 0

## 2023-04-13 ASSESSMENT — ENCOUNTER SYMPTOMS
COLOR CHANGE: 0
DIARRHEA: 0
SHORTNESS OF BREATH: 0
VOMITING: 0
ABDOMINAL PAIN: 0
VOICE CHANGE: 0
SINUS PRESSURE: 0
RHINORRHEA: 0
COUGH: 1
WHEEZING: 0
EYE DISCHARGE: 0
EYE REDNESS: 0
BLOOD IN STOOL: 0
CHEST TIGHTNESS: 0
EYE PAIN: 0
SORE THROAT: 0

## 2023-04-26 DIAGNOSIS — J20.8 ACUTE BRONCHITIS DUE TO OTHER SPECIFIED ORGANISMS: Primary | ICD-10-CM

## 2023-04-26 RX ORDER — AZITHROMYCIN 250 MG/1
250 TABLET, FILM COATED ORAL SEE ADMIN INSTRUCTIONS
Qty: 6 TABLET | Refills: 0 | Status: SHIPPED | OUTPATIENT
Start: 2023-04-26 | End: 2023-05-01

## 2023-05-01 ENCOUNTER — OFFICE VISIT (OUTPATIENT)
Dept: PRIMARY CARE CLINIC | Age: 53
End: 2023-05-01
Payer: OTHER GOVERNMENT

## 2023-05-01 VITALS
OXYGEN SATURATION: 97 % | HEART RATE: 87 BPM | BODY MASS INDEX: 22.5 KG/M2 | SYSTOLIC BLOOD PRESSURE: 128 MMHG | HEIGHT: 63 IN | TEMPERATURE: 97.3 F | DIASTOLIC BLOOD PRESSURE: 76 MMHG | WEIGHT: 127 LBS

## 2023-05-01 DIAGNOSIS — E78.00 PURE HYPERCHOLESTEROLEMIA: ICD-10-CM

## 2023-05-01 DIAGNOSIS — Z00.01 ENCOUNTER FOR WELL ADULT EXAM WITH ABNORMAL FINDINGS: Primary | ICD-10-CM

## 2023-05-01 DIAGNOSIS — F33.0 MILD EPISODE OF RECURRENT MAJOR DEPRESSIVE DISORDER (HCC): ICD-10-CM

## 2023-05-01 DIAGNOSIS — E55.9 AVITAMINOSIS D: ICD-10-CM

## 2023-05-01 DIAGNOSIS — Z79.899 MEDICATION MANAGEMENT: ICD-10-CM

## 2023-05-01 DIAGNOSIS — Z13.820 SCREENING FOR OSTEOPOROSIS: ICD-10-CM

## 2023-05-01 DIAGNOSIS — Z78.0 POST-MENOPAUSAL: ICD-10-CM

## 2023-05-01 DIAGNOSIS — F51.01 PRIMARY INSOMNIA: ICD-10-CM

## 2023-05-01 DIAGNOSIS — Z13.29 SCREENING FOR THYROID DISORDER: ICD-10-CM

## 2023-05-01 DIAGNOSIS — Z13.0 SCREENING FOR DEFICIENCY ANEMIA: ICD-10-CM

## 2023-05-01 PROCEDURE — 80305 DRUG TEST PRSMV DIR OPT OBS: CPT | Performed by: INTERNAL MEDICINE

## 2023-05-01 PROCEDURE — 99396 PREV VISIT EST AGE 40-64: CPT | Performed by: INTERNAL MEDICINE

## 2023-05-01 RX ORDER — ESZOPICLONE 2 MG/1
2 TABLET, FILM COATED ORAL NIGHTLY
Qty: 30 TABLET | Refills: 2 | Status: SHIPPED | OUTPATIENT
Start: 2023-05-01 | End: 2023-08-01

## 2023-05-01 ASSESSMENT — ENCOUNTER SYMPTOMS
EYE DISCHARGE: 0
EYE PAIN: 0
ABDOMINAL PAIN: 0
VOMITING: 0
WHEEZING: 0
SORE THROAT: 0
EYE REDNESS: 0
SINUS PRESSURE: 0
RHINORRHEA: 0
BLOOD IN STOOL: 0
SHORTNESS OF BREATH: 0
CHEST TIGHTNESS: 0
DIARRHEA: 0
VOICE CHANGE: 0
COUGH: 1
COLOR CHANGE: 0

## 2023-05-01 ASSESSMENT — VISUAL ACUITY: OU: 1

## 2023-05-01 NOTE — PROGRESS NOTES
Recombinant (Shingrix) 09/20/2018, 02/04/2019        Health Maintenance   Topic Date Due    COVID-19 Vaccine (1) Never done    Flu vaccine (Season Ended) 11/27/2023 (Originally 8/1/2023)    Colorectal Cancer Screen  08/14/2023    Depression Monitoring  04/13/2024    Breast cancer screen  09/27/2024    Lipids  10/24/2027    DTaP/Tdap/Td vaccine (2 - Td or Tdap) 09/20/2028    Shingles vaccine  Completed    Hepatitis C screen  Completed    HIV screen  Completed    Hepatitis A vaccine  Aged Out    Hib vaccine  Aged Out    Meningococcal (ACWY) vaccine  Aged Out    Pneumococcal 0-64 years Vaccine  Aged Out    Cervical cancer screen  Discontinued     Recommendations for Preventive Services Due: see orders and patient instructions/AVS.    Return in 4 months (on 9/1/2023) for Controlled med refill, insomnia. Advance Care Planning   Advanced Care Planning: Discussed the patients choices for care and treatment in case of a health event that adversely affects decision-making abilities. Also discussed the patients long-term treatment options. Reviewed the process of designating a Health Care Surrogate as defined by the [address]. Reviewed the [facility] Will Directive process and the kinds of life-sustaining treatments the patient would like to receive should they become terminally ill or permanently unconscious. Explained the state requirement to complete the forms in the presence of two eligible witnesses OR in the presence of a . Patient was asked to provide a copy of the signed forms to the practice office. Time spent (minutes): 3-5 min, handout provided     Cardiovascular Disease Risk Counseling: Assessed the patient's risk to develop cardiovascular disease and reviewed main risk factors.    Reviewed steps to reduce disease risk including:   Quitting tobacco use, reducing amount smoked, or not starting the habit  Making healthy food choices  Being physically active and gradualy increasing activity

## 2023-05-17 ENCOUNTER — HOSPITAL ENCOUNTER (OUTPATIENT)
Dept: GENERAL RADIOLOGY | Age: 53
Discharge: HOME | End: 2023-05-17
Payer: OTHER GOVERNMENT

## 2023-05-17 ENCOUNTER — OFFICE VISIT (OUTPATIENT)
Dept: PRIMARY CARE CLINIC | Age: 53
End: 2023-05-17
Payer: OTHER GOVERNMENT

## 2023-05-17 ENCOUNTER — HOSPITAL ENCOUNTER (OUTPATIENT)
Dept: ULTRASOUND IMAGING | Age: 53
Discharge: HOME | End: 2023-05-17
Payer: OTHER GOVERNMENT

## 2023-05-17 VITALS
WEIGHT: 127 LBS | DIASTOLIC BLOOD PRESSURE: 68 MMHG | TEMPERATURE: 97.6 F | OXYGEN SATURATION: 98 % | HEART RATE: 74 BPM | SYSTOLIC BLOOD PRESSURE: 118 MMHG | HEIGHT: 63 IN | BODY MASS INDEX: 22.5 KG/M2

## 2023-05-17 DIAGNOSIS — Z13.0 SCREENING FOR DEFICIENCY ANEMIA: ICD-10-CM

## 2023-05-17 DIAGNOSIS — R07.89 RIGHT-SIDED CHEST WALL PAIN: ICD-10-CM

## 2023-05-17 DIAGNOSIS — R07.81 PLEURITIC CHEST PAIN: ICD-10-CM

## 2023-05-17 DIAGNOSIS — Z13.29 SCREENING FOR THYROID DISORDER: ICD-10-CM

## 2023-05-17 DIAGNOSIS — Z78.0 POST-MENOPAUSAL: ICD-10-CM

## 2023-05-17 DIAGNOSIS — M94.0 ACUTE COSTOCHONDRITIS: Primary | ICD-10-CM

## 2023-05-17 DIAGNOSIS — D72.818 OTHER DECREASED WHITE BLOOD CELL (WBC) COUNT: ICD-10-CM

## 2023-05-17 DIAGNOSIS — R05.3 PERSISTENT DRY COUGH: ICD-10-CM

## 2023-05-17 DIAGNOSIS — R74.8 ELEVATED LIVER ENZYMES: ICD-10-CM

## 2023-05-17 DIAGNOSIS — E78.00 PURE HYPERCHOLESTEROLEMIA: ICD-10-CM

## 2023-05-17 DIAGNOSIS — Z13.820 SCREENING FOR OSTEOPOROSIS: ICD-10-CM

## 2023-05-17 DIAGNOSIS — E55.9 AVITAMINOSIS D: ICD-10-CM

## 2023-05-17 LAB
25(OH)D3 SERPL-MCNC: 79.8 NG/ML
ALBUMIN SERPL-MCNC: 4.6 G/DL (ref 3.5–5.2)
ALP SERPL-CCNC: 132 U/L (ref 35–104)
ALT SERPL-CCNC: 38 U/L (ref 5–33)
ANION GAP SERPL CALCULATED.3IONS-SCNC: 12 MMOL/L (ref 7–19)
AST SERPL-CCNC: 25 U/L (ref 5–32)
BASOPHILS # BLD: 0 K/UL (ref 0–0.2)
BASOPHILS NFR BLD: 0.9 % (ref 0–1)
BILIRUB SERPL-MCNC: 0.5 MG/DL (ref 0.2–1.2)
BUN SERPL-MCNC: 18 MG/DL (ref 6–20)
CALCIUM SERPL-MCNC: 9.6 MG/DL (ref 8.6–10)
CHLORIDE SERPL-SCNC: 101 MMOL/L (ref 98–111)
CHOLEST SERPL-MCNC: 252 MG/DL (ref 160–199)
CO2 SERPL-SCNC: 26 MMOL/L (ref 22–29)
CREAT SERPL-MCNC: 0.7 MG/DL (ref 0.5–0.9)
EOSINOPHIL # BLD: 0.1 K/UL (ref 0–0.6)
EOSINOPHIL NFR BLD: 2.5 % (ref 0–5)
ERYTHROCYTE [DISTWIDTH] IN BLOOD BY AUTOMATED COUNT: 12.4 % (ref 11.5–14.5)
GLUCOSE SERPL-MCNC: 95 MG/DL (ref 74–109)
HCT VFR BLD AUTO: 38.8 % (ref 37–47)
HDLC SERPL-MCNC: 103 MG/DL (ref 65–121)
HGB BLD-MCNC: 12.4 G/DL (ref 12–16)
IMM GRANULOCYTES # BLD: 0 K/UL
LDLC SERPL CALC-MCNC: 138 MG/DL
LYMPHOCYTES # BLD: 1.7 K/UL (ref 1.1–4.5)
LYMPHOCYTES NFR BLD: 37.6 % (ref 20–40)
MCH RBC QN AUTO: 30.8 PG (ref 27–31)
MCHC RBC AUTO-ENTMCNC: 32 G/DL (ref 33–37)
MCV RBC AUTO: 96.5 FL (ref 81–99)
MONOCYTES # BLD: 0.4 K/UL (ref 0–0.9)
MONOCYTES NFR BLD: 8.4 % (ref 0–10)
NEUTROPHILS # BLD: 2.2 K/UL (ref 1.5–7.5)
NEUTS SEG NFR BLD: 50.4 % (ref 50–65)
PLATELET # BLD AUTO: 295 K/UL (ref 130–400)
PMV BLD AUTO: 10.2 FL (ref 9.4–12.3)
POTASSIUM SERPL-SCNC: 4.4 MMOL/L (ref 3.5–5)
PROT SERPL-MCNC: 7 G/DL (ref 6.6–8.7)
RBC # BLD AUTO: 4.02 M/UL (ref 4.2–5.4)
SODIUM SERPL-SCNC: 139 MMOL/L (ref 136–145)
TRIGL SERPL-MCNC: 55 MG/DL (ref 0–149)
TSH SERPL DL<=0.005 MIU/L-ACNC: 1.17 UIU/ML (ref 0.35–5.5)
WBC # BLD AUTO: 4.4 K/UL (ref 4.8–10.8)

## 2023-05-17 PROCEDURE — 99214 OFFICE O/P EST MOD 30 MIN: CPT | Performed by: INTERNAL MEDICINE

## 2023-05-17 PROCEDURE — 71046 X-RAY EXAM CHEST 2 VIEWS: CPT

## 2023-05-17 PROCEDURE — 77080 DXA BONE DENSITY AXIAL: CPT

## 2023-05-17 PROCEDURE — 77080 DXA BONE DENSITY AXIAL: CPT | Performed by: RADIOLOGY

## 2023-05-17 PROCEDURE — 71046 X-RAY EXAM CHEST 2 VIEWS: CPT | Performed by: RADIOLOGY

## 2023-05-17 RX ORDER — BENZONATATE 100 MG/1
200 CAPSULE ORAL 3 TIMES DAILY PRN
Qty: 60 CAPSULE | Refills: 1 | Status: SHIPPED | OUTPATIENT
Start: 2023-05-17 | End: 2023-05-31

## 2023-05-17 RX ORDER — IBUPROFEN 800 MG/1
800 TABLET ORAL EVERY 8 HOURS PRN
Qty: 42 TABLET | Refills: 1 | Status: SHIPPED | OUTPATIENT
Start: 2023-05-17 | End: 2023-05-31

## 2023-05-17 RX ORDER — METHYLPREDNISOLONE 4 MG/1
TABLET ORAL
Qty: 1 KIT | Refills: 0 | Status: SHIPPED | OUTPATIENT
Start: 2023-05-17 | End: 2023-05-23

## 2023-05-17 ASSESSMENT — ENCOUNTER SYMPTOMS
VOMITING: 0
STRIDOR: 0
SORE THROAT: 0
EYE DISCHARGE: 0
VOICE CHANGE: 0
DIARRHEA: 0
BLOOD IN STOOL: 0
EYE REDNESS: 0
RHINORRHEA: 0
EYE PAIN: 0
SINUS PRESSURE: 0
CHEST TIGHTNESS: 0
ABDOMINAL PAIN: 0
COUGH: 1
SHORTNESS OF BREATH: 1
WHEEZING: 0
COLOR CHANGE: 0

## 2023-05-17 NOTE — PROGRESS NOTES
Everett Barakat is a 52 y.o. female who presents today for   Chief Complaint   Patient presents with    Other     Pain under right breast when cough, breathe or with any movement        HPI  51 y/o WF here with c/o pain under and around her right breast in right chest area which hurts more with cough, inspiration, and movement. Cough is more dry but started over a month ago now with a viral illness. No fever, palpitations, or irregular heart beat. She did fly to [place], [place] around 4/14/23. She does have an incentive spirometry at home she can use if needed. Review of Systems   Constitutional:  Negative for appetite change, chills, fatigue and fever. HENT:  Negative for ear pain, rhinorrhea, sinus pressure, sore throat and voice change. Eyes:  Negative for pain, discharge and redness. Respiratory:  Positive for cough and shortness of breath. Negative for chest tightness, wheezing and stridor. See HPI   Cardiovascular:  Negative for chest pain, palpitations and leg swelling.        +chest wall pain/right chest pain with inspiration, see HPI   Gastrointestinal:  Negative for abdominal pain, blood in stool, diarrhea and vomiting. Endocrine: Negative for cold intolerance, heat intolerance and polydipsia. Genitourinary:  Negative for dysuria and hematuria. Musculoskeletal:  Negative for arthralgias, myalgias, neck pain and neck stiffness. Skin:  Negative for color change and rash. Neurological:  Negative for dizziness, tremors, syncope, speech difficulty, weakness, numbness and headaches. Hematological:  Negative for adenopathy. Does not bruise/bleed easily. Psychiatric/Behavioral:  Negative for confusion, dysphoric mood and sleep disturbance. The patient is not nervous/anxious. All other systems reviewed and are negative.     Past Medical History:   Diagnosis Date    Acute gastritis     Allergic rhinitis     Basal cell carcinoma     Chondromalacia of knee     COVID-19 6/2/2022

## 2023-05-19 PROBLEM — M85.89 OSTEOPENIA OF MULTIPLE SITES: Status: ACTIVE | Noted: 2023-05-19

## 2023-10-25 ENCOUNTER — HOSPITAL ENCOUNTER (OUTPATIENT)
Dept: WOMENS IMAGING | Age: 53
Discharge: HOME | End: 2023-10-25
Payer: OTHER GOVERNMENT

## 2023-10-25 ENCOUNTER — OFFICE VISIT (OUTPATIENT)
Dept: PRIMARY CARE CLINIC | Age: 53
End: 2023-10-25
Payer: OTHER GOVERNMENT

## 2023-10-25 VITALS
DIASTOLIC BLOOD PRESSURE: 68 MMHG | SYSTOLIC BLOOD PRESSURE: 120 MMHG | BODY MASS INDEX: 23.4 KG/M2 | WEIGHT: 132.13 LBS | TEMPERATURE: 97.4 F

## 2023-10-25 DIAGNOSIS — R74.8 ELEVATED LIVER ENZYMES: ICD-10-CM

## 2023-10-25 DIAGNOSIS — D72.818 OTHER DECREASED WHITE BLOOD CELL (WBC) COUNT: ICD-10-CM

## 2023-10-25 DIAGNOSIS — Z23 FLU VACCINE NEED: ICD-10-CM

## 2023-10-25 DIAGNOSIS — Z12.31 ENCOUNTER FOR SCREENING MAMMOGRAM FOR MALIGNANT NEOPLASM OF BREAST: ICD-10-CM

## 2023-10-25 DIAGNOSIS — F51.01 PRIMARY INSOMNIA: Primary | ICD-10-CM

## 2023-10-25 PROBLEM — D72.819 LEUKOPENIA: Status: ACTIVE | Noted: 2023-10-25

## 2023-10-25 PROCEDURE — 90471 IMMUNIZATION ADMIN: CPT | Performed by: INTERNAL MEDICINE

## 2023-10-25 PROCEDURE — 99214 OFFICE O/P EST MOD 30 MIN: CPT | Performed by: INTERNAL MEDICINE

## 2023-10-25 PROCEDURE — 90674 CCIIV4 VAC NO PRSV 0.5 ML IM: CPT | Performed by: INTERNAL MEDICINE

## 2023-10-25 PROCEDURE — 77063 BREAST TOMOSYNTHESIS BI: CPT

## 2023-10-25 RX ORDER — ESZOPICLONE 2 MG/1
TABLET, FILM COATED ORAL
Qty: 30 TABLET | Refills: 0 | Status: SHIPPED | OUTPATIENT
Start: 2023-10-25 | End: 2023-11-24

## 2023-10-25 ASSESSMENT — ENCOUNTER SYMPTOMS
CHEST TIGHTNESS: 0
SHORTNESS OF BREATH: 0
EYE REDNESS: 0
VOICE CHANGE: 0
DIARRHEA: 0
VOMITING: 0
ABDOMINAL PAIN: 0
SINUS PRESSURE: 0
SORE THROAT: 0
EYE DISCHARGE: 0
RHINORRHEA: 0
EYE PAIN: 0
WHEEZING: 0
COUGH: 0
COLOR CHANGE: 0
BLOOD IN STOOL: 0

## 2023-10-25 NOTE — PROGRESS NOTES
tolerates the 1 mg dose, she can try increasing back to 2 mg qhs if needed. Patient to contact provider and update on how she is doing in 1-2 weeks. Controlled substance contract and urine drug screen are up-to-date currently. No unusual filling on recent [place] report. Treatment continues to be medically necessary and improves patient quality of life. No signs of misuse of controlled medication.      - Mild leukopenia without neutropenia-stable on recent blood work and no issues with increased infections, unexplained fever, or unexplained weight loss.      -Flu vaccine updated today    -Return in about 3 months (around 2024) for insomnia, Controlled med refill. Orders Placed This Encounter   Procedures    Influenza, FLUCELVAX, (age 10 mo+), IM, Preservative Free, 0.5 mL     Orders Placed This Encounter   Medications    eszopiclone (LUNESTA) 2 MG TABS     Si/2-1 tablet at bedtime as needed for sleep     Dispense:  30 tablet     Refill:  0     Medications Discontinued During This Encounter   Medication Reason    ibuprofen (ADVIL;MOTRIN) 800 MG tablet LIST CLEANUP    eszopiclone (LUNESTA) 2 MG TABS REORDER     There are no Patient Instructions on file for this visit. Patient voices understanding and agrees to plans along with risks and benefits of plan. Counseling:  Stu Sanchez [facility]'s case, medications and options were discussed in detail. patient was instructed to call the office if she   questions regarding her treatment. Should her conditions worsen, she should return to office to be reassessed by Dr. Veneta Crigler. she  Should to go the closest Emergency Department for any emergency. They verbalized understanding the above instructions.

## 2024-02-05 ENCOUNTER — OFFICE VISIT (OUTPATIENT)
Dept: PRIMARY CARE CLINIC | Age: 54
End: 2024-02-05
Payer: OTHER GOVERNMENT

## 2024-02-05 VITALS
BODY MASS INDEX: 23.88 KG/M2 | TEMPERATURE: 98.8 F | DIASTOLIC BLOOD PRESSURE: 90 MMHG | SYSTOLIC BLOOD PRESSURE: 128 MMHG | HEART RATE: 97 BPM | OXYGEN SATURATION: 96 % | WEIGHT: 134.8 LBS

## 2024-02-05 DIAGNOSIS — J02.0 STREP THROAT: Primary | ICD-10-CM

## 2024-02-05 DIAGNOSIS — J02.9 SORE THROAT: ICD-10-CM

## 2024-02-05 PROCEDURE — 99213 OFFICE O/P EST LOW 20 MIN: CPT | Performed by: NURSE PRACTITIONER

## 2024-02-05 RX ORDER — AMOXICILLIN 500 MG/1
500 CAPSULE ORAL 2 TIMES DAILY
Qty: 20 CAPSULE | Refills: 0 | Status: SHIPPED | OUTPATIENT
Start: 2024-02-05 | End: 2024-02-15

## 2024-02-05 RX ORDER — BUDESONIDE 0.25 MG/2ML
1 INHALANT ORAL
Qty: 60 EACH | Refills: 3 | Status: SHIPPED | OUTPATIENT
Start: 2024-02-05

## 2024-02-05 RX ORDER — BENZONATATE 100 MG/1
100 CAPSULE ORAL 3 TIMES DAILY PRN
Qty: 30 CAPSULE | Refills: 0 | Status: SHIPPED | OUTPATIENT
Start: 2024-02-05 | End: 2024-02-12

## 2024-02-05 ASSESSMENT — ENCOUNTER SYMPTOMS
ALLERGIC/IMMUNOLOGIC NEGATIVE: 1
EYES NEGATIVE: 1
SORE THROAT: 1
COUGH: 1
GASTROINTESTINAL NEGATIVE: 1

## 2024-02-05 NOTE — PROGRESS NOTES
Behavior: Behavior normal.         Thought Content: Thought content normal.         Judgment: Judgment normal.            ASSESSMENT/PLAN:  1. Strep throat  2. Sore throat  -     POCT rapid strep A      No follow-ups on file.     POCT strep: positive  Amoxicillin 500mg bid x 10 days  Tessalon perles as needed for cough  Budesonide neb treatment as needed for congestion  Keep follow up with PCP, return sooner if needed    PDMP Monitoring:    Last PDMP [place] as Reviewed:  Review User Review Instant Review Result            Urine Drug Screenings (1 yr)       POCT Rapid Drug Screen  Collected: 5/1/2023 (Final result)                  Medication Contract and Consent for Opioid Use Documents Filed       Patient Documents       Type of Document Status Date Received Received By Description    Medication Contract Received 5/1/2023  2:52 PM BETHANIE YARBROUGH Med Contract 5-1-23    Medication Contract Received 5/1/2023  2:52 PM BETHANIE YARBROUGH [place] 5-1-23                     Patient given educational materials -see patient instructions.  Discussed use, benefit, and side effects of prescribed medications.  All patient questions answered.  Pt voiced understanding. Reviewed health maintenance.  Instructed to continue currentmedications, diet and exercise.  Patient agreed with treatment plan. Follow up as directed.   MEDICATIONS:  Orders Placed This Encounter   Medications    amoxicillin (AMOXIL) 500 MG capsule     Sig: Take 1 capsule by mouth 2 times daily for 10 days     Dispense:  20 capsule     Refill:  0    benzonatate (TESSALON PERLES) 100 MG capsule     Sig: Take 1 capsule by mouth 3 times daily as needed for Cough     Dispense:  30 capsule     Refill:  0    budesonide (PULMICORT) 0.25 MG/2ML nebulizer suspension     Sig: Take 2 mLs by nebulization in the morning and 2 mLs in the evening.     Dispense:  60 each     Refill:  3         ORDERS:  Orders Placed This Encounter   Procedures    POCT rapid strep A

## 2024-12-10 ENCOUNTER — HOSPITAL ENCOUNTER (OUTPATIENT)
Dept: WOMENS IMAGING | Age: 54
Discharge: HOME | End: 2024-12-10
Payer: OTHER GOVERNMENT

## 2024-12-10 DIAGNOSIS — Z12.31 SCREENING MAMMOGRAM FOR BREAST CANCER: ICD-10-CM

## 2024-12-10 PROCEDURE — 77063 BREAST TOMOSYNTHESIS BI: CPT

## 2025-01-22 DIAGNOSIS — Z13.1 SCREENING FOR DIABETES MELLITUS: ICD-10-CM

## 2025-01-22 DIAGNOSIS — Z13.220 SCREENING FOR HYPERLIPIDEMIA: ICD-10-CM

## 2025-01-22 DIAGNOSIS — R23.2 HOT FLASHES: ICD-10-CM

## 2025-01-22 DIAGNOSIS — E55.9 VITAMIN D DEFICIENCY: ICD-10-CM

## 2025-01-22 DIAGNOSIS — R53.82 CHRONIC FATIGUE: Primary | ICD-10-CM

## 2025-01-22 DIAGNOSIS — R61 NIGHT SWEATS: ICD-10-CM

## 2025-02-18 DIAGNOSIS — R23.2 HOT FLASHES: ICD-10-CM

## 2025-02-18 DIAGNOSIS — R53.82 CHRONIC FATIGUE: ICD-10-CM

## 2025-02-18 DIAGNOSIS — E55.9 VITAMIN D DEFICIENCY: ICD-10-CM

## 2025-02-18 DIAGNOSIS — R61 NIGHT SWEATS: ICD-10-CM

## 2025-02-18 DIAGNOSIS — Z13.220 SCREENING FOR HYPERLIPIDEMIA: ICD-10-CM

## 2025-02-18 LAB
25(OH)D3 SERPL-MCNC: 40.9 NG/ML
ALBUMIN SERPL-MCNC: 4.6 G/DL (ref 3.5–5.2)
ALP SERPL-CCNC: 113 U/L (ref 35–104)
ALT SERPL-CCNC: 24 U/L (ref 5–33)
ANION GAP SERPL CALCULATED.3IONS-SCNC: 10 MMOL/L (ref 8–16)
AST SERPL-CCNC: 18 U/L (ref 5–32)
BASOPHILS # BLD: 0 K/UL (ref 0–0.2)
BASOPHILS NFR BLD: 0.9 % (ref 0–1)
BILIRUB SERPL-MCNC: 0.3 MG/DL (ref 0.2–1.2)
BUN SERPL-MCNC: 12 MG/DL (ref 6–20)
CALCIUM SERPL-MCNC: 9.4 MG/DL (ref 8.6–10)
CHLORIDE SERPL-SCNC: 100 MMOL/L (ref 98–107)
CHOLEST SERPL-MCNC: 229 MG/DL (ref 0–199)
CO2 SERPL-SCNC: 28 MMOL/L (ref 22–29)
CREAT SERPL-MCNC: 0.7 MG/DL (ref 0.5–0.9)
EOSINOPHIL # BLD: 0.1 K/UL (ref 0–0.6)
EOSINOPHIL NFR BLD: 2.1 % (ref 0–5)
ERYTHROCYTE [DISTWIDTH] IN BLOOD BY AUTOMATED COUNT: 11.8 % (ref 11.5–14.5)
FOLATE SERPL-MCNC: 18.3 NG/ML (ref 4.8–37.3)
GLUCOSE SERPL-MCNC: 95 MG/DL (ref 70–99)
HCT VFR BLD AUTO: 40.4 % (ref 37–47)
HDLC SERPL-MCNC: 96 MG/DL (ref 40–60)
HGB BLD-MCNC: 13 G/DL (ref 12–16)
IMM GRANULOCYTES # BLD: 0 K/UL
LDLC SERPL CALC-MCNC: 122 MG/DL
LYMPHOCYTES # BLD: 1.7 K/UL (ref 1.1–4.5)
LYMPHOCYTES NFR BLD: 36.9 % (ref 20–40)
MCH RBC QN AUTO: 31 PG (ref 27–31)
MCHC RBC AUTO-ENTMCNC: 32.2 G/DL (ref 33–37)
MCV RBC AUTO: 96.2 FL (ref 81–99)
MONOCYTES # BLD: 0.4 K/UL (ref 0–0.9)
MONOCYTES NFR BLD: 9.2 % (ref 0–10)
NEUTROPHILS # BLD: 2.4 K/UL (ref 1.5–7.5)
NEUTS SEG NFR BLD: 50.7 % (ref 50–65)
PLATELET # BLD AUTO: 356 K/UL (ref 130–400)
PMV BLD AUTO: 9.6 FL (ref 9.4–12.3)
POTASSIUM SERPL-SCNC: 4.7 MMOL/L (ref 3.5–5.1)
PROGEST SERPL-MCNC: 0.21 NG/ML
PROT SERPL-MCNC: 7.1 G/DL (ref 6.4–8.3)
RBC # BLD AUTO: 4.2 M/UL (ref 4.2–5.4)
SODIUM SERPL-SCNC: 138 MMOL/L (ref 136–145)
T4 FREE SERPL-MCNC: 1.51 NG/DL (ref 0.93–1.7)
TRIGL SERPL-MCNC: 54 MG/DL (ref 0–149)
TSH SERPL DL<=0.005 MIU/L-ACNC: 1.07 UIU/ML (ref 0.27–4.2)
VIT B12 SERPL-MCNC: 1289 PG/ML (ref 232–1245)
WBC # BLD AUTO: 4.7 K/UL (ref 4.8–10.8)

## 2025-02-21 LAB
SHBG SERPL-SCNC: 64 NMOL/L (ref 17–125)
TESTOST FREE SERPL-MCNC: 0.9 PG/ML (ref 0.6–3.8)
TESTOST SERPL-MCNC: 8 NG/DL (ref 9–55)

## 2025-02-22 LAB
ESTRADIOL SERPL HS-MCNC: 2.8 PG/ML
ESTROGEN SERPL CALC-MCNC: 10.2 PG/ML
ESTRONE SERPL-MCNC: 7.4 PG/ML

## 2025-02-26 ASSESSMENT — PATIENT HEALTH QUESTIONNAIRE - PHQ9
2. FEELING DOWN, DEPRESSED OR HOPELESS: NOT AT ALL
1. LITTLE INTEREST OR PLEASURE IN DOING THINGS: SEVERAL DAYS
4. FEELING TIRED OR HAVING LITTLE ENERGY: NEARLY EVERY DAY
9. THOUGHTS THAT YOU WOULD BE BETTER OFF DEAD, OR OF HURTING YOURSELF: NOT AT ALL
SUM OF ALL RESPONSES TO PHQ QUESTIONS 1-9: 9
SUM OF ALL RESPONSES TO PHQ QUESTIONS 1-9: 9
2. FEELING DOWN, DEPRESSED OR HOPELESS: NOT AT ALL
SUM OF ALL RESPONSES TO PHQ9 QUESTIONS 1 & 2: 1
SUM OF ALL RESPONSES TO PHQ QUESTIONS 1-9: 9
8. MOVING OR SPEAKING SO SLOWLY THAT OTHER PEOPLE COULD HAVE NOTICED. OR THE OPPOSITE, BEING SO FIGETY OR RESTLESS THAT YOU HAVE BEEN MOVING AROUND A LOT MORE THAN USUAL: NOT AT ALL
8. MOVING OR SPEAKING SO SLOWLY THAT OTHER PEOPLE COULD HAVE NOTICED. OR THE OPPOSITE - BEING SO FIDGETY OR RESTLESS THAT YOU HAVE BEEN MOVING AROUND A LOT MORE THAN USUAL: NOT AT ALL
5. POOR APPETITE OR OVEREATING: NOT AT ALL
7. TROUBLE CONCENTRATING ON THINGS, SUCH AS READING THE NEWSPAPER OR WATCHING TELEVISION: MORE THAN HALF THE DAYS
10. IF YOU CHECKED OFF ANY PROBLEMS, HOW DIFFICULT HAVE THESE PROBLEMS MADE IT FOR YOU TO DO YOUR WORK, TAKE CARE OF THINGS AT HOME, OR GET ALONG WITH OTHER PEOPLE: NOT DIFFICULT AT ALL
3. TROUBLE FALLING OR STAYING ASLEEP: NEARLY EVERY DAY
3. TROUBLE FALLING OR STAYING ASLEEP: NEARLY EVERY DAY
SUM OF ALL RESPONSES TO PHQ QUESTIONS 1-9: 9
5. POOR APPETITE OR OVEREATING: NOT AT ALL
6. FEELING BAD ABOUT YOURSELF - OR THAT YOU ARE A FAILURE OR HAVE LET YOURSELF OR YOUR FAMILY DOWN: NOT AT ALL
7. TROUBLE CONCENTRATING ON THINGS, SUCH AS READING THE NEWSPAPER OR WATCHING TELEVISION: MORE THAN HALF THE DAYS
1. LITTLE INTEREST OR PLEASURE IN DOING THINGS: SEVERAL DAYS
9. THOUGHTS THAT YOU WOULD BE BETTER OFF DEAD, OR OF HURTING YOURSELF: NOT AT ALL
4. FEELING TIRED OR HAVING LITTLE ENERGY: NEARLY EVERY DAY
SUM OF ALL RESPONSES TO PHQ QUESTIONS 1-9: 9
10. IF YOU CHECKED OFF ANY PROBLEMS, HOW DIFFICULT HAVE THESE PROBLEMS MADE IT FOR YOU TO DO YOUR WORK, TAKE CARE OF THINGS AT HOME, OR GET ALONG WITH OTHER PEOPLE: NOT DIFFICULT AT ALL
6. FEELING BAD ABOUT YOURSELF - OR THAT YOU ARE A FAILURE OR HAVE LET YOURSELF OR YOUR FAMILY DOWN: NOT AT ALL

## 2025-02-27 ENCOUNTER — OFFICE VISIT (OUTPATIENT)
Dept: PRIMARY CARE CLINIC | Age: 55
End: 2025-02-27
Payer: OTHER GOVERNMENT

## 2025-02-27 VITALS
BODY MASS INDEX: 24.27 KG/M2 | DIASTOLIC BLOOD PRESSURE: 74 MMHG | TEMPERATURE: 98.2 F | HEIGHT: 63 IN | HEART RATE: 72 BPM | SYSTOLIC BLOOD PRESSURE: 118 MMHG | WEIGHT: 137 LBS | OXYGEN SATURATION: 97 %

## 2025-02-27 DIAGNOSIS — K86.2 PANCREATIC CYST: ICD-10-CM

## 2025-02-27 DIAGNOSIS — F41.1 GAD (GENERALIZED ANXIETY DISORDER): ICD-10-CM

## 2025-02-27 DIAGNOSIS — N95.1 MENOPAUSAL STATE: ICD-10-CM

## 2025-02-27 DIAGNOSIS — G47.33 OBSTRUCTIVE SLEEP APNEA SYNDROME: ICD-10-CM

## 2025-02-27 DIAGNOSIS — Z00.01 ENCOUNTER FOR WELL ADULT EXAM WITH ABNORMAL FINDINGS: Primary | ICD-10-CM

## 2025-02-27 DIAGNOSIS — E78.00 PURE HYPERCHOLESTEROLEMIA: ICD-10-CM

## 2025-02-27 DIAGNOSIS — E55.9 AVITAMINOSIS D: ICD-10-CM

## 2025-02-27 DIAGNOSIS — F33.0 MILD EPISODE OF RECURRENT MAJOR DEPRESSIVE DISORDER: ICD-10-CM

## 2025-02-27 DIAGNOSIS — F51.01 PRIMARY INSOMNIA: ICD-10-CM

## 2025-02-27 DIAGNOSIS — K50.10 CROHN'S DISEASE OF LARGE INTESTINE WITHOUT COMPLICATION (HCC): ICD-10-CM

## 2025-02-27 PROBLEM — R74.8 ELEVATED LIVER ENZYMES: Status: RESOLVED | Noted: 2023-10-25 | Resolved: 2025-02-27

## 2025-02-27 PROCEDURE — 99396 PREV VISIT EST AGE 40-64: CPT | Performed by: INTERNAL MEDICINE

## 2025-02-27 SDOH — ECONOMIC STABILITY: FOOD INSECURITY: WITHIN THE PAST 12 MONTHS, YOU WORRIED THAT YOUR FOOD WOULD RUN OUT BEFORE YOU GOT MONEY TO BUY MORE.: NEVER TRUE

## 2025-02-27 SDOH — ECONOMIC STABILITY: FOOD INSECURITY: WITHIN THE PAST 12 MONTHS, THE FOOD YOU BOUGHT JUST DIDN'T LAST AND YOU DIDN'T HAVE MONEY TO GET MORE.: NEVER TRUE

## 2025-02-27 ASSESSMENT — ENCOUNTER SYMPTOMS
SHORTNESS OF BREATH: 0
ABDOMINAL PAIN: 0
CONSTIPATION: 1
COLOR CHANGE: 0
CHEST TIGHTNESS: 0
RHINORRHEA: 0
VOMITING: 0
COUGH: 0
WHEEZING: 0
SINUS PRESSURE: 0
EYE PAIN: 0
EYE DISCHARGE: 0
BLOOD IN STOOL: 0
EYE REDNESS: 0
SORE THROAT: 0
DIARRHEA: 0
VOICE CHANGE: 0

## 2025-02-27 NOTE — PROGRESS NOTES
Well Adult Note  Name: Lizzie Dong Today’s Date: 2025   MRN: [id number] Sex: Female   Age: 54 y.o. Ethnicity: Non- / Non    : 1970 Race: White (non-)      Lizzie Dong is here for a well adult exam.          Assessment & Plan  1. Menopausal symptoms.  Her estrogen levels have significantly decreased since 2022, indicating a postmenopausal state. Progesterone levels are not within the menopausal range and have improved since 2021. Total testosterone levels have remained consistent since 2019, while free testosterone levels have slightly increased from 0.8 on 2021 to 0.9 on 2025. These hormonal changes could be contributing to her low libido, fatigue, night sweats, hot flashes, vaginal dryness, and mood alterations. She has never been on hormone replacement therapy. A comprehensive discussion was held regarding the potential benefits and risks associated with hormone replacement therapy. She was advised to consider topical estrogen cream, to be applied three times weekly, to alleviate vaginal dryness, hot flashes, and night sweats. The potential impact on libido was also discussed. Given her history of hysterectomy, the use of progesterone is not necessary. She was reassured about the safety of this treatment approach. She was also informed that discontinuing sertraline might improve her libido. If these interventions do not yield the desired results, the possibility of testosterone therapy will be explored. A handout on vaginal estrogen was provided for further information.    2. Vitamin D deficiency.  Her vitamin D levels are within the normal range at 40.9. Continue over the counter vitamin D replacement.    3. Health maintenance.  Her TSH and free T4 levels are within the normal range. Her cholesterol levels have improved since 2023, with a total cholesterol of 229, triglycerides of 54, HDL of 96, and LDL of 122. Her 10-year risk of heart attack,

## 2025-05-22 ENCOUNTER — OFFICE VISIT (OUTPATIENT)
Dept: PRIMARY CARE CLINIC | Age: 55
End: 2025-05-22
Payer: OTHER GOVERNMENT

## 2025-05-22 VITALS
BODY MASS INDEX: 24.45 KG/M2 | HEART RATE: 63 BPM | DIASTOLIC BLOOD PRESSURE: 76 MMHG | TEMPERATURE: 97.6 F | OXYGEN SATURATION: 95 % | SYSTOLIC BLOOD PRESSURE: 120 MMHG | WEIGHT: 138 LBS | HEIGHT: 63 IN

## 2025-05-22 DIAGNOSIS — L21.9 SEBORRHEIC DERMATITIS: Primary | ICD-10-CM

## 2025-05-22 PROCEDURE — 99213 OFFICE O/P EST LOW 20 MIN: CPT | Performed by: NURSE PRACTITIONER

## 2025-05-22 RX ORDER — CLOBETASOL PROPIONATE 0.5 MG/G
CREAM TOPICAL
Qty: 30 G | Refills: 0 | Status: SHIPPED | OUTPATIENT
Start: 2025-05-22

## 2025-05-26 ASSESSMENT — ENCOUNTER SYMPTOMS
VOMITING: 0
CHEST TIGHTNESS: 0
SORE THROAT: 0
DIARRHEA: 0
COLOR CHANGE: 0
COUGH: 0
SHORTNESS OF BREATH: 0
NAUSEA: 0
ABDOMINAL PAIN: 0

## 2025-05-26 NOTE — PROGRESS NOTES
Ms.Marie Dong is a 55 y.o. female who presents today for  Chief Complaint   Patient presents with    Ear Pain       HPI:  History of Present Illness  The patient presents for evaluation of left ear pain.    She has been experiencing persistent pain in her left ear for the past 2 weeks, which she describes as crusty and localized to the cartilage area. She reports no associated symptoms such as cough or congestion. Her  attempted to examine the affected area with a light but was unable to identify any abnormalities.     Recently, she was diagnosed with sleep apnea through [facility] and has started using a CPAP machine. She is curious if this could be contributing to her ear discomfort. She has tried an antiseptic on it once or twice without much benefit.       Results         Review of Systems   Constitutional:  Negative for activity change and fever.   HENT:  Positive for ear pain. Negative for congestion and sore throat.    Respiratory:  Negative for cough, chest tightness and shortness of breath.    Cardiovascular:  Negative for chest pain.   Gastrointestinal:  Negative for abdominal pain, diarrhea, nausea and vomiting.   Genitourinary:  Negative for frequency and urgency.   Musculoskeletal:  Negative for arthralgias and myalgias.   Skin:  Negative for color change.   Neurological:  Negative for dizziness, weakness and numbness.   Psychiatric/Behavioral:  Negative for agitation. The patient is not nervous/anxious.        Past Medical History:   Diagnosis Date    Acute gastritis     Allergic rhinitis     Basal cell carcinoma     Chondromalacia of knee     COVID-19 6/2/2022    Degenerative disc disease, lumbar 9/18/2017    Depression with anxiety     Dermoid cyst of leg     GERD (gastroesophageal reflux disease)     IBS (irritable bowel syndrome)     Lower back pain     Lumbar radiculopathy     Mixed hyperlipidemia 4/11/2022    Muscle spasm of back     Obstructive sleep apnea syndrome

## 2025-07-11 ENCOUNTER — OFFICE VISIT (OUTPATIENT)
Dept: PRIMARY CARE CLINIC | Age: 55
End: 2025-07-11
Payer: OTHER GOVERNMENT

## 2025-07-11 VITALS
TEMPERATURE: 96.9 F | WEIGHT: 129.4 LBS | DIASTOLIC BLOOD PRESSURE: 72 MMHG | BODY MASS INDEX: 22.93 KG/M2 | SYSTOLIC BLOOD PRESSURE: 128 MMHG | OXYGEN SATURATION: 96 % | HEIGHT: 63 IN | HEART RATE: 84 BPM

## 2025-07-11 DIAGNOSIS — J06.9 UPPER RESPIRATORY TRACT INFECTION, UNSPECIFIED TYPE: Primary | ICD-10-CM

## 2025-07-11 LAB
INFLUENZA A ANTIGEN, POC: NEGATIVE
INFLUENZA B ANTIGEN, POC: NEGATIVE
LOT EXPIRE DATE: NORMAL
LOT KIT NUMBER: NORMAL
SARS-COV-2, POC: NORMAL
VALID INTERNAL CONTROL: NORMAL
VENDOR AND KIT NAME POC: NORMAL

## 2025-07-11 PROCEDURE — 87428 SARSCOV & INF VIR A&B AG IA: CPT | Performed by: NURSE PRACTITIONER

## 2025-07-11 PROCEDURE — 99213 OFFICE O/P EST LOW 20 MIN: CPT | Performed by: NURSE PRACTITIONER

## 2025-07-11 RX ORDER — METHYLPREDNISOLONE 4 MG/1
TABLET ORAL
Qty: 1 KIT | Refills: 0 | Status: SHIPPED | OUTPATIENT
Start: 2025-07-11 | End: 2025-07-17

## 2025-07-11 ASSESSMENT — ENCOUNTER SYMPTOMS
ABDOMINAL PAIN: 0
VOMITING: 0
SHORTNESS OF BREATH: 0
COLOR CHANGE: 0
NAUSEA: 0
CHEST TIGHTNESS: 0
SORE THROAT: 1
COUGH: 1
DIARRHEA: 0
RHINORRHEA: 1

## 2025-07-11 NOTE — PROGRESS NOTES
Ms.Marie Dong is a 55 y.o. female who presents today for  Chief Complaint   Patient presents with    Headache    Cough    Nasal Congestion       HPI:  History of Present Illness  The patient presents for evaluation of swollen lymph nodes in her neck.    She sought medical attention at [facility] on 07/09/2025 due to discomfort, but was only tested for strep, which returned negative. She was prescribed Augmentin for the swollen lymph nodes in her neck, but her condition has not improved. She continues to experience neck and head pain, along with the discharge of yellow mucus. No steroids were administered during her visit to urgent care. She reports tolerating steroids well. She is concerned about the possibility of being contagious as she plans to attend a retreat this weekend. She has not been tested for COVID-19. Her symptoms began on 07/06/2025 with a severe sore throat that lasted for approximately 3 days.       Results  Labs   - Strep test: 7, Negative       Review of Systems   Constitutional:  Negative for activity change and fever.   HENT:  Positive for congestion, rhinorrhea and sore throat. Negative for ear pain.    Respiratory:  Positive for cough. Negative for chest tightness and shortness of breath.    Cardiovascular:  Negative for chest pain.   Gastrointestinal:  Negative for abdominal pain, diarrhea, nausea and vomiting.   Genitourinary:  Negative for frequency and urgency.   Musculoskeletal:  Negative for arthralgias and myalgias.   Skin:  Negative for color change.   Neurological:  Negative for dizziness, weakness and numbness.   Psychiatric/Behavioral:  Negative for agitation. The patient is not nervous/anxious.        Past Medical History:   Diagnosis Date    Acute gastritis     Allergic rhinitis     Basal cell carcinoma     Chondromalacia of knee     COVID-19 6/2/2022    Degenerative disc disease, lumbar 9/18/2017    Depression with anxiety     Dermoid cyst of leg     GERD

## 2025-08-01 DIAGNOSIS — R05.1 ACUTE COUGH: Primary | ICD-10-CM

## 2025-08-01 RX ORDER — BENZONATATE 200 MG/1
200 CAPSULE ORAL 3 TIMES DAILY PRN
Qty: 30 CAPSULE | Refills: 0 | Status: SHIPPED | OUTPATIENT
Start: 2025-08-01 | End: 2025-08-11